# Patient Record
Sex: MALE | Race: WHITE | NOT HISPANIC OR LATINO | Employment: FULL TIME | URBAN - METROPOLITAN AREA
[De-identification: names, ages, dates, MRNs, and addresses within clinical notes are randomized per-mention and may not be internally consistent; named-entity substitution may affect disease eponyms.]

---

## 2018-08-16 ENCOUNTER — APPOINTMENT (OUTPATIENT)
Dept: CT IMAGING | Facility: HOSPITAL | Age: 23
End: 2018-08-16

## 2018-08-16 ENCOUNTER — HOSPITAL ENCOUNTER (EMERGENCY)
Facility: HOSPITAL | Age: 23
Discharge: HOME | End: 2018-08-16
Attending: EMERGENCY MEDICINE | Admitting: EMERGENCY MEDICINE

## 2018-08-16 VITALS
OXYGEN SATURATION: 100 % | HEART RATE: 71 BPM | SYSTOLIC BLOOD PRESSURE: 146 MMHG | RESPIRATION RATE: 16 BRPM | HEIGHT: 67 IN | DIASTOLIC BLOOD PRESSURE: 101 MMHG | TEMPERATURE: 99.9 F | WEIGHT: 191 LBS | BODY MASS INDEX: 29.98 KG/M2

## 2018-08-16 DIAGNOSIS — K52.9 COLITIS, ACUTE: Primary | ICD-10-CM

## 2018-08-16 LAB
ALBUMIN SERPL-MCNC: 5.4 G/DL (ref 3.5–5.2)
ALBUMIN/GLOB SERPL: 1.5 G/DL
ALP SERPL-CCNC: 61 U/L (ref 39–117)
ALT SERPL W P-5'-P-CCNC: 55 U/L (ref 1–41)
ANION GAP SERPL CALCULATED.3IONS-SCNC: 13.6 MMOL/L
AST SERPL-CCNC: 19 U/L (ref 1–40)
BASOPHILS # BLD AUTO: 0.01 10*3/MM3 (ref 0–0.2)
BASOPHILS NFR BLD AUTO: 0.1 % (ref 0–1.5)
BILIRUB SERPL-MCNC: 0.3 MG/DL (ref 0.1–1.2)
BUN BLD-MCNC: 13 MG/DL (ref 6–20)
BUN/CREAT SERPL: 14.4 (ref 7–25)
CALCIUM SPEC-SCNC: 9.7 MG/DL (ref 8.6–10.5)
CHLORIDE SERPL-SCNC: 100 MMOL/L (ref 98–107)
CO2 SERPL-SCNC: 26.4 MMOL/L (ref 22–29)
CREAT BLD-MCNC: 0.9 MG/DL (ref 0.76–1.27)
DEPRECATED RDW RBC AUTO: 44.8 FL (ref 37–54)
EOSINOPHIL # BLD AUTO: 0 10*3/MM3 (ref 0–0.7)
EOSINOPHIL NFR BLD AUTO: 0 % (ref 0.3–6.2)
ERYTHROCYTE [DISTWIDTH] IN BLOOD BY AUTOMATED COUNT: 13.2 % (ref 11.5–14.5)
GFR SERPL CREATININE-BSD FRML MDRD: 104 ML/MIN/1.73
GLOBULIN UR ELPH-MCNC: 3.5 GM/DL
GLUCOSE BLD-MCNC: 112 MG/DL (ref 65–99)
HCT VFR BLD AUTO: 51.9 % (ref 40.4–52.2)
HGB BLD-MCNC: 16.6 G/DL (ref 13.7–17.6)
HOLD SPECIMEN: NORMAL
HOLD SPECIMEN: NORMAL
IMM GRANULOCYTES # BLD: 0.02 10*3/MM3 (ref 0–0.03)
IMM GRANULOCYTES NFR BLD: 0.2 % (ref 0–0.5)
LIPASE SERPL-CCNC: 20 U/L (ref 13–60)
LYMPHOCYTES # BLD AUTO: 1.18 10*3/MM3 (ref 0.9–4.8)
LYMPHOCYTES NFR BLD AUTO: 9.4 % (ref 19.6–45.3)
MCH RBC QN AUTO: 29.8 PG (ref 27–32.7)
MCHC RBC AUTO-ENTMCNC: 32 G/DL (ref 32.6–36.4)
MCV RBC AUTO: 93.2 FL (ref 79.8–96.2)
MONOCYTES # BLD AUTO: 0.72 10*3/MM3 (ref 0.2–1.2)
MONOCYTES NFR BLD AUTO: 5.7 % (ref 5–12)
NEUTROPHILS # BLD AUTO: 10.69 10*3/MM3 (ref 1.9–8.1)
NEUTROPHILS NFR BLD AUTO: 84.6 % (ref 42.7–76)
PLATELET # BLD AUTO: 290 10*3/MM3 (ref 140–500)
PMV BLD AUTO: 12 FL (ref 6–12)
POTASSIUM BLD-SCNC: 4.4 MMOL/L (ref 3.5–5.2)
PROT SERPL-MCNC: 8.9 G/DL (ref 6–8.5)
RBC # BLD AUTO: 5.57 10*6/MM3 (ref 4.6–6)
SODIUM BLD-SCNC: 140 MMOL/L (ref 136–145)
WBC NRBC COR # BLD: 12.62 10*3/MM3 (ref 4.5–10.7)
WHOLE BLOOD HOLD SPECIMEN: NORMAL
WHOLE BLOOD HOLD SPECIMEN: NORMAL

## 2018-08-16 PROCEDURE — 96374 THER/PROPH/DIAG INJ IV PUSH: CPT

## 2018-08-16 PROCEDURE — 85025 COMPLETE CBC W/AUTO DIFF WBC: CPT | Performed by: NURSE PRACTITIONER

## 2018-08-16 PROCEDURE — 74177 CT ABD & PELVIS W/CONTRAST: CPT

## 2018-08-16 PROCEDURE — 25010000002 IOPAMIDOL 61 % SOLUTION: Performed by: EMERGENCY MEDICINE

## 2018-08-16 PROCEDURE — 80053 COMPREHEN METABOLIC PANEL: CPT | Performed by: NURSE PRACTITIONER

## 2018-08-16 PROCEDURE — 83690 ASSAY OF LIPASE: CPT | Performed by: NURSE PRACTITIONER

## 2018-08-16 PROCEDURE — 25010000002 KETOROLAC TROMETHAMINE PER 15 MG: Performed by: NURSE PRACTITIONER

## 2018-08-16 PROCEDURE — 25010000002 ONDANSETRON PER 1 MG: Performed by: NURSE PRACTITIONER

## 2018-08-16 PROCEDURE — 99283 EMERGENCY DEPT VISIT LOW MDM: CPT

## 2018-08-16 PROCEDURE — 96375 TX/PRO/DX INJ NEW DRUG ADDON: CPT

## 2018-08-16 RX ORDER — KETOROLAC TROMETHAMINE 30 MG/ML
30 INJECTION, SOLUTION INTRAMUSCULAR; INTRAVENOUS ONCE
Status: COMPLETED | OUTPATIENT
Start: 2018-08-16 | End: 2018-08-16

## 2018-08-16 RX ORDER — CIPROFLOXACIN 500 MG/1
500 TABLET, FILM COATED ORAL EVERY 12 HOURS
Qty: 10 TABLET | Refills: 0 | Status: SHIPPED | OUTPATIENT
Start: 2018-08-16 | End: 2020-08-24

## 2018-08-16 RX ORDER — SODIUM CHLORIDE 0.9 % (FLUSH) 0.9 %
10 SYRINGE (ML) INJECTION AS NEEDED
Status: DISCONTINUED | OUTPATIENT
Start: 2018-08-16 | End: 2018-08-17 | Stop reason: HOSPADM

## 2018-08-16 RX ORDER — METRONIDAZOLE 500 MG/1
500 TABLET ORAL 3 TIMES DAILY
Qty: 15 TABLET | Refills: 0 | Status: SHIPPED | OUTPATIENT
Start: 2018-08-16 | End: 2020-08-24

## 2018-08-16 RX ORDER — ONDANSETRON 2 MG/ML
4 INJECTION INTRAMUSCULAR; INTRAVENOUS ONCE
Status: COMPLETED | OUTPATIENT
Start: 2018-08-16 | End: 2018-08-16

## 2018-08-16 RX ADMIN — IOPAMIDOL 85 ML: 612 INJECTION, SOLUTION INTRAVENOUS at 21:07

## 2018-08-16 RX ADMIN — ONDANSETRON 4 MG: 2 INJECTION INTRAMUSCULAR; INTRAVENOUS at 20:25

## 2018-08-16 RX ADMIN — SODIUM CHLORIDE 1000 ML: 9 INJECTION, SOLUTION INTRAVENOUS at 20:26

## 2018-08-16 RX ADMIN — KETOROLAC TROMETHAMINE 30 MG: 30 INJECTION, SOLUTION INTRAMUSCULAR at 20:25

## 2018-08-16 NOTE — ED TRIAGE NOTES
Patient to er with nausea/ vomiting and ABD pain that started today.patient reported blood in stool today.

## 2018-08-17 NOTE — ED PROVIDER NOTES
[facility] EMERGENCY DEPARTMENT ENCOUNTER    CHIEF COMPLAINT  Chief Complaint: abdominal pain  History given by: patient  History limited by: none  [facility] Room Number: 39/39  PMD: Provider, No Known      HPI:  Pt is a 23 y.o. male who presents complaining of diffuse waxing and waning upper abdominal pain that radiates around to the back. He denies vomiting, dysuria, SOA and other complaints, but reports chills and diaphoresis. Denies abdominal surgeries and pancreatitis. Pt reports having a US of his gall bladder a few years go and found it to be enlarged.    Onset: gradual  Duration: one day  Severity: moderate  Associated symptoms: vomiting, dysuria  Previous treatment: none    PAST MEDICAL HISTORY  Active Ambulatory Problems     Diagnosis Date Noted   • No Active Ambulatory Problems     Resolved Ambulatory Problems     Diagnosis Date Noted   • No Resolved Ambulatory Problems     No Additional Past Medical History       PAST SURGICAL HISTORY  History reviewed. No pertinent surgical history.    FAMILY HISTORY  History reviewed. No pertinent family history.    SOCIAL HISTORY  Social History     Social History   • Marital status: Single     Spouse name: N/A   • Number of children: N/A   • Years of education: N/A     Occupational History   • Not on file.     Social History Main Topics   • Smoking status: Former Smoker     Quit date: 8/16/2015   • Smokeless tobacco: Never Used   • Alcohol use Yes      Comment: occ   • Drug use: No   • Sexual activity: Defer     Other Topics Concern   • Not on file     Social History Narrative   • No narrative on file       ALLERGIES  Patient has no known allergies.    REVIEW OF SYSTEMS  Review of Systems   Constitutional: Positive for chills and diaphoresis. Negative for activity change, appetite change and fever.   HENT: Negative for congestion and sore throat.    Eyes: Negative.    Respiratory: Negative for cough and shortness of breath.    Cardiovascular: Negative for chest pain and leg  swelling.   Gastrointestinal: Positive for abdominal pain. Negative for diarrhea and vomiting.   Endocrine: Negative.    Genitourinary: Negative for decreased urine volume and dysuria.   Musculoskeletal: Negative for neck pain.   Skin: Negative for rash and wound.   Allergic/Immunologic: Negative.    Neurological: Negative for weakness, numbness and headaches.   Hematological: Negative.    Psychiatric/Behavioral: Negative.    All other systems reviewed and are negative.      PHYSICAL EXAM  ED Triage Vitals   Temp Heart Rate Resp BP SpO2   08/16/18 1643 08/16/18 1643 08/16/18 1643 08/16/18 1732 08/16/18 1643   99.9 °F (37.7 °C) 109 16 124/86 100 %      Temp src Heart Rate Source Patient Position BP Location FiO2 (%)   08/16/18 1643 08/16/18 1643 -- -- --   Tympanic Monitor          Physical Exam   Constitutional: He is oriented to person, place, and time. No distress.   HENT:   Head: Normocephalic and atraumatic.   Eyes: Pupils are equal, round, and reactive to light. EOM are normal.   Neck: Normal range of motion. Neck supple.   Cardiovascular: Normal rate, regular rhythm and normal heart sounds.    Pulmonary/Chest: Effort normal and breath sounds normal. No respiratory distress.   Abdominal: Soft. There is tenderness in the periumbilical area. There is no rebound, no guarding and no CVA tenderness.   Musculoskeletal: Normal range of motion. He exhibits no edema.   Neurological: He is alert and oriented to person, place, and time. He has normal sensation and normal strength.   Skin: Skin is warm and dry.   Psychiatric: Mood and affect normal.   Nursing note and vitals reviewed.      LAB RESULTS  Lab Results (last 24 hours)     Procedure Component Value Units Date/Time    POC Urinalysis Dipstick, Multipro (Automated dipstick) [[id number]]  (Abnormal) Collected:  08/16/18 1607    Specimen:  Urine Updated:  08/16/18 1613     Color Yellow     Clarity, UA Slightly Cloudy (A)     Glucose, UA Negative mg/dL      Bilirubin  Negative     Ketones, UA Negative     Specific Gravity  1.020     Blood, UA Negative     pH, Urine 5.0     Protein, POC Negative mg/dL      Urobilinogen, UA Normal     Nitrite, UA Negative     Leukocytes Trace (A)    CBC & Differential [[id number]] Collected:  08/16/18 1805    Specimen:  Blood Updated:  08/16/18 1831    Narrative:       The following orders were created for panel order CBC & Differential.  Procedure                               Abnormality         Status                     ---------                               -----------         ------                     CBC Auto Differential[[id number]]        Abnormal            Final result                 Please view results for these tests on the individual orders.    Comprehensive Metabolic Panel [[id number]]  (Abnormal) Collected:  08/16/18 1805    Specimen:  Blood Updated:  08/16/18 1849     Glucose 112 (H) mg/dL      BUN 13 mg/dL      Creatinine 0.90 mg/dL      Sodium 140 mmol/L      Potassium 4.4 mmol/L      Chloride 100 mmol/L      CO2 26.4 mmol/L      Calcium 9.7 mg/dL      Total Protein 8.9 (H) g/dL      Albumin 5.40 (H) g/dL      ALT (SGPT) 55 (H) U/L      AST (SGOT) 19 U/L      Alkaline Phosphatase 61 U/L      Total Bilirubin 0.3 mg/dL      eGFR Non African Amer 104 mL/min/1.73      Globulin 3.5 gm/dL      A/G Ratio 1.5 g/dL      BUN/Creatinine Ratio 14.4     Anion Gap 13.6 mmol/L     Lipase [[id number]]  (Normal) Collected:  08/16/18 1805    Specimen:  Blood Updated:  08/16/18 1849     Lipase 20 U/L     CBC Auto Differential [[id number]]  (Abnormal) Collected:  08/16/18 1805    Specimen:  Blood Updated:  08/16/18 1831     WBC 12.62 (H) 10*3/mm3      RBC 5.57 10*6/mm3      Hemoglobin 16.6 g/dL      Hematocrit 51.9 %      MCV 93.2 fL      MCH 29.8 pg      MCHC 32.0 (L) g/dL      RDW 13.2 %      RDW-SD 44.8 fl      MPV 12.0 fL      Platelets 290 10*3/mm3      Neutrophil % 84.6 (H) %      Lymphocyte % 9.4 (L) %      Monocyte % 5.7 %      Eosinophil %  0.0 (L) %      Basophil % 0.1 %      Immature Grans % 0.2 %      Neutrophils, Absolute 10.69 (H) 10*3/mm3      Lymphocytes, Absolute 1.18 10*3/mm3      Monocytes, Absolute 0.72 10*3/mm3      Eosinophils, Absolute 0.00 10*3/mm3      Basophils, Absolute 0.01 10*3/mm3      Immature Grans, Absolute 0.02 10*3/mm3           I ordered the above labs and reviewed the results    RADIOLOGY  CT Abdomen Pelvis With Contrast   Preliminary Result   Mild colitis of the distal transverse and descending colon is suspected.   No obstruction, perforation or abscess.                          I ordered the above noted radiological studies. Interpreted by radiologist. Reviewed by me in PACS.       PROCEDURES  Procedures      PROGRESS AND CONSULTS  ED Course as of Aug 16 2213   Thu Aug 16, 2018   1826 Abdominal pain with nausea & vomiting that began today.  Pt denies fever, chills or urinary symptoms  [MS]      ED Course User Index  [MS] Cristinamarli Jocelynn R, APRN   2200  BP- (!) 146/101 HR- 71 Temp- 99.9 °F (37.7 °C) (Tympanic) O2 sat- 100%  Rechecked the patient who is in NAD and is resting comfortably. Pt made aware that imaging showed colitis and the plan to d/c w/ abx. Pt will be given work note.    MEDICAL DECISION MAKING  Results were reviewed/discussed with the patient and they were also made aware of online access. Pt also made aware that some labs, such as cultures, will not be resulted during ER visit and follow up with PMD is necessary.     MDM  Number of Diagnoses or Management Options  Colitis, acute:      Amount and/or Complexity of Data Reviewed  Clinical lab tests: reviewed (Labs unremarkable)  Tests in the radiology section of CPT®: reviewed (CT abd-mild colitis.)  Independent visualization of images, tracings, or specimens: yes    Patient Progress  Patient progress: stable         DIAGNOSIS  Final diagnoses:   Colitis, acute       DISPOSITION  DISCHARGE    Patient discharged in stable condition.    Reviewed  implications of results, diagnosis, meds, responsibility to follow up, warning signs and symptoms of possible worsening, potential complications and reasons to return to ER.    Patient/Family voiced understanding of above instructions.    Discussed plan for discharge, as there is no emergent indication for admission. Patient referred to primary care provider for BP management due to today's BP. Pt/family is agreeable and understands need for follow up and repeat testing.  Pt is aware that discharge does not mean that nothing is wrong but it indicates no emergency is present that requires admission and they must continue care with follow-up as given below or physician of their choice.     FOLLOW-UP  PATIENT LIAISON [facility] [id number]  [phone]  Schedule an appointment as soon as possible for a visit            Medication List      New Prescriptions    ciprofloxacin 500 MG tablet  Commonly known as:  CIPRO  Take 1 tablet by mouth Every 12 (Twelve) Hours.     metroNIDAZOLE 500 MG tablet  Commonly known as:  FLAGYL  Take 1 tablet by mouth 3 (Three) Times a Day.            Latest Documented Vital Signs:  As of 10:13 PM  BP- (!) 146/101 HR- 71 Temp- 99.9 °F (37.7 °C) (Tympanic) O2 sat- 100%    --  Documentation assistance provided by thomas Nguyen for Dr. Alcantar.  Information recorded by the scribe was done at my direction and has been verified and validated by me.       Reina Nguyen  08/16/18 [id number]       Rahul Alcantar MD  08/17/18 [id number]

## 2020-08-24 ENCOUNTER — HOSPITAL ENCOUNTER (OUTPATIENT)
Dept: GENERAL RADIOLOGY | Facility: HOSPITAL | Age: 25
Discharge: HOME | End: 2020-08-24
Admitting: FAMILY MEDICINE

## 2020-08-24 PROCEDURE — U0003 INFECTIOUS AGENT DETECTION BY NUCLEIC ACID (DNA OR RNA); SEVERE ACUTE RESPIRATORY SYNDROME CORONAVIRUS 2 (SARS-COV-2) (CORONAVIRUS DISEASE [COVID-19]), AMPLIFIED PROBE TECHNIQUE, MAKING USE OF HIGH THROUGHPUT TECHNOLOGIES AS DESCRIBED BY CMS-2020-01-R: HCPCS | Performed by: FAMILY MEDICINE

## 2020-08-24 PROCEDURE — 71046 X-RAY EXAM CHEST 2 VIEWS: CPT

## 2020-09-25 ENCOUNTER — HOSPITAL ENCOUNTER (OUTPATIENT)
Dept: GENERAL RADIOLOGY | Facility: HOSPITAL | Age: 25
Discharge: HOME | End: 2020-09-25

## 2020-09-25 PROCEDURE — 71046 X-RAY EXAM CHEST 2 VIEWS: CPT

## 2020-09-25 PROCEDURE — 74018 RADEX ABDOMEN 1 VIEW: CPT

## 2021-01-05 ENCOUNTER — OFFICE VISIT (OUTPATIENT)
Age: 26
End: 2021-01-05

## 2021-01-05 ENCOUNTER — OFFICE VISIT (OUTPATIENT)
Dept: URGENT CARE | Age: 26
End: 2021-01-05
Payer: COMMERCIAL

## 2021-01-05 VITALS
SYSTOLIC BLOOD PRESSURE: 115 MMHG | DIASTOLIC BLOOD PRESSURE: 81 MMHG | RESPIRATION RATE: 18 BRPM | TEMPERATURE: 98.5 F | OXYGEN SATURATION: 98 % | BODY MASS INDEX: 32.39 KG/M2 | WEIGHT: 206.4 LBS | HEART RATE: 93 BPM | HEIGHT: 67 IN

## 2021-01-05 DIAGNOSIS — R05.8 COUGH PRODUCTIVE OF PURULENT SPUTUM: ICD-10-CM

## 2021-01-05 DIAGNOSIS — R06.02 SOB (SHORTNESS OF BREATH): ICD-10-CM

## 2021-01-05 DIAGNOSIS — J02.9 PHARYNGITIS, UNSPECIFIED ETIOLOGY: ICD-10-CM

## 2021-01-05 DIAGNOSIS — Z11.59 SCREENING FOR VIRAL DISEASE: Primary | ICD-10-CM

## 2021-01-05 DIAGNOSIS — L08.9 SKIN INFECTION: ICD-10-CM

## 2021-01-05 DIAGNOSIS — J06.9 UPPER RESPIRATORY TRACT INFECTION, UNSPECIFIED TYPE: Primary | ICD-10-CM

## 2021-01-05 DIAGNOSIS — Z11.59 SCREENING FOR VIRAL DISEASE: ICD-10-CM

## 2021-01-05 LAB — S PYO AG THROAT QL: NORMAL

## 2021-01-05 PROCEDURE — 87880 STREP A ASSAY W/OPTIC: CPT | Performed by: NURSE PRACTITIONER

## 2021-01-05 PROCEDURE — 99204 OFFICE O/P NEW MOD 45 MIN: CPT | Performed by: NURSE PRACTITIONER

## 2021-01-05 RX ORDER — ALBUTEROL SULFATE 90 UG/1
2 AEROSOL, METERED RESPIRATORY (INHALATION) EVERY 6 HOURS PRN
COMMUNITY

## 2021-01-05 RX ORDER — ALBUTEROL SULFATE 90 UG/1
AEROSOL, METERED RESPIRATORY (INHALATION)
Qty: 25.5 G | Refills: 0 | Status: SHIPPED | OUTPATIENT
Start: 2021-01-05 | End: 2021-06-04

## 2021-01-05 RX ORDER — AZITHROMYCIN 250 MG/1
250 TABLET, FILM COATED ORAL SEE ADMIN INSTRUCTIONS
Qty: 6 TABLET | Refills: 0 | Status: SHIPPED | OUTPATIENT
Start: 2021-01-05 | End: 2021-01-10

## 2021-01-05 RX ORDER — METHYLPREDNISOLONE 4 MG/1
TABLET ORAL
Qty: 1 KIT | Refills: 0 | Status: SHIPPED | OUTPATIENT
Start: 2021-01-05 | End: 2021-06-04

## 2021-01-05 RX ORDER — ALBUTEROL SULFATE 90 UG/1
2 AEROSOL, METERED RESPIRATORY (INHALATION) 4 TIMES DAILY PRN
Qty: 1 INHALER | Refills: 0 | Status: SHIPPED | OUTPATIENT
Start: 2021-01-05 | End: 2021-01-05

## 2021-01-05 ASSESSMENT — ENCOUNTER SYMPTOMS
DIARRHEA: 1
CONSTIPATION: 0
CHEST TIGHTNESS: 0
SHORTNESS OF BREATH: 1
EYES NEGATIVE: 1
RHINORRHEA: 1
NAUSEA: 0
SORE THROAT: 1
VOMITING: 0
WHEEZING: 0
COUGH: 1

## 2021-01-05 NOTE — PROGRESS NOTES
Appearance: Normal appearance. He is not ill-appearing or toxic-appearing. HENT:      Head: Normocephalic and atraumatic. Right Ear: Tympanic membrane, ear canal and external ear normal.      Left Ear: Tympanic membrane, ear canal and external ear normal.      Nose: Nose normal.      Mouth/Throat:      Mouth: Mucous membranes are moist.      Pharynx: Posterior oropharyngeal erythema present. Eyes:      Extraocular Movements: Extraocular movements intact. Conjunctiva/sclera: Conjunctivae normal.      Pupils: Pupils are equal, round, and reactive to light. Neck:     Cardiovascular:      Rate and Rhythm: Normal rate and regular rhythm. Heart sounds: Normal heart sounds. No murmur. Pulmonary:      Effort: Pulmonary effort is normal. No respiratory distress. Breath sounds: No wheezing. Musculoskeletal:         General: No swelling or signs of injury. Skin:     General: Skin is warm and dry. Findings: No rash. Neurological:      General: No focal deficit present. Mental Status: He is alert and oriented to person, place, and time. Motor: No weakness. Psychiatric:         Mood and Affect: Mood normal.       /81   Pulse 93   Temp 98.5 °F (36.9 °C)   Resp 18   Ht 5' 7\" (1.702 m)   Wt 206 lb 6.4 oz (93.6 kg)   SpO2 98%   BMI 32.33 kg/m²     Assessment:       Diagnosis Orders   1. Upper respiratory tract infection, unspecified type     2. SOB (shortness of breath)  methylPREDNISolone (MEDROL DOSEPACK) 4 MG tablet    albuterol sulfate HFA (VENTOLIN HFA) 108 (90 Base) MCG/ACT inhaler   3. Cough productive of purulent sputum  azithromycin (ZITHROMAX) 250 MG tablet   4. Skin infection  mupirocin (BACTROBAN) 2 % ointment   5. Screening for viral disease     6.  Pharyngitis, unspecified etiology  POCT rapid strep A       Plan:      Orders Placed This Encounter   Procedures    POCT rapid strep A     Results for orders placed or performed in visit on 01/05/21 POCT rapid strep A   Result Value Ref Range    Strep A Ag None Detected None Detected       Patient states he was recently diagnosed with mold allergy and was given Albuterol inhaler. He lives at apartment complex that has mold under floors. He states he is almost out of inhaler. Covid test today    Return if symptoms worsen or fail to improve. Orders Placed This Encounter   Medications    mupirocin (BACTROBAN) 2 % ointment     Sig: Apply 3 times daily to right neck. Dispense:  3 g     Refill:  0    azithromycin (ZITHROMAX) 250 MG tablet     Sig: Take 1 tablet by mouth See Admin Instructions for 5 days 500mg on day 1 followed by 250mg on days 2 - 5     Dispense:  6 tablet     Refill:  0    methylPREDNISolone (MEDROL DOSEPACK) 4 MG tablet     Sig: Take by mouth. Dispense:  1 kit     Refill:  0    albuterol sulfate HFA (VENTOLIN HFA) 108 (90 Base) MCG/ACT inhaler     Sig: Inhale 2 puffs into the lungs 4 times daily as needed for Wheezing     Dispense:  1 Inhaler     Refill:  0       Patient given educational materials- see patient instructions. Discussed use, benefit, and side effects of prescribedmedications. All patient questions answered. Pt voiced understanding. Reviewedhealth maintenance. Instructed to continue current medications, diet and exercise. Patient agreed with treatment plan. Follow up as directed. Patient Instructions     Use Mupirocin ointment to wound on neck. Start antibiotic and steroids as prescribed. Strep is negative. You have been tested for coronavirus using a nasopharyngeal swab. You are to quarantine until your test results are back. This typically takes 2-3 days and we will call you with results. 1. Rest and increase water intake. 2. Monitor for fever and treat as needed with over the counter Tylenol/Ibuprofen per package instructions. 3. Treat symptoms such as cough/congestion with over the counter medications. 4. Go to ED if symptoms worsen or if you experience chest pain, shortness of breath, or a fever that is uncontrolled with medication. Patient Education        Learning About Coronavirus [phone])  Coronavirus [phone]): Overview  What is coronavirus ([id number])? The coronavirus disease (COVID-19) is caused by a virus. It is an illness that was first found in December 2019. It has since spread worldwide. The virus can cause fever, cough, and trouble breathing. In severe cases, it can cause pneumonia and make it hard to breathe without help. It can cause death. This virus spreads person-to-person through droplets from coughing and sneezing. It can also spread when you are close to someone who is infected. And it can spread when you touch something that has the virus on it, such as a doorknob or a tabletop. Coronaviruses are a large group of viruses. They cause the common cold. They also cause more serious illnesses like Middle East respiratory syndrome (MERS) and severe acute respiratory syndrome (SARS). COVID-19 is caused by a novel coronavirus. That means it's a new type that has not been seen in people before. How is COVID-19 treated? Mild illness can be treated at home, but more serious illness needs to be treated in the hospital. Treatment may include medicines to reduce symptoms, plus breathing support such as oxygen therapy or a ventilator. Other treatments, such as antiviral medicines, may help people who have COVID-19. What can you do to protect yourself from COVID-19? The best way to protect yourself from getting sick is to:  · Avoid areas where there is an outbreak. · Avoid contact with people who may be infected. · Avoid crowds and try to stay at least 6 feet away from other people. · Wash your hands often, especially after you cough or sneeze. Use soap and water, and scrub for at least 20 seconds. If soap and water aren't available, use an alcohol-based hand . · You have severe trouble breathing. (You can't talk at all.)  · You have constant chest pain or pressure. · You are severely dizzy or lightheaded. · You are confused or can't think clearly. · Your face and lips have a blue color. · You passed out (lost consciousness) or are very hard to wake up. Call your doctor now if you develop symptoms such as:  · Shortness of breath. · Fever. · Cough. If you need to get care, call ahead to the doctor's office for instructions before you go. Make sure you wear a face cover to prevent exposing other people to the virus. Where can you get the latest information? The following health organizations are tracking and studying this virus. Their websites contain the most up-to-date information. Markos Huerta also learn what to do if you think you may have been exposed to the virus. · U.S. Centers for Disease Control and Prevention (CDC): The CDC provides updated news about the disease and travel advice. The website also tells you how to prevent the spread of infection. www.cdc.gov  · World Health Organization [facility]): WHO offers information about the virus outbreaks. WHO also has travel advice. www.who.int  Current as of: July 10, 2020               Content Version: 12.6  © 2006-2020 [employer]. Care instructions adapted under license by [facility] [place] ([facility]). If you have questions about a medical condition or this instruction, always ask your healthcare professional. [place] [address] any warranty or liability for your use of this information. Patient Education        Cough: Care Instructions  Your Care Instructions     A cough is your body's response to something that bothers your throat or airways. Many things can cause a cough. You might cough because of a cold or the flu, bronchitis, or asthma. Smoking, postnasal drip, allergies, and stomach acid that backs up into your throat also can cause coughs. A cough is a symptom, not a disease. Most coughs stop when the cause, such as a cold, goes away. You can take a few steps at home to cough less and feel better. Follow-up care is a key part of your treatment and safety. Be sure to make and go to all appointments, and call your doctor if you are having problems. It's also a good idea to know your test results and keep a list of the medicines you take. How can you care for yourself at home? · Drink lots of water and other fluids. This helps thin the mucus and soothes a dry or sore throat. Honey or lemon juice in hot water or tea may ease a dry cough. · Take cough medicine as directed by your doctor. · Prop up your head on pillows to help you breathe and ease a dry cough. · Try cough drops to soothe a dry or sore throat. Cough drops don't stop a cough. Medicine-flavored cough drops are no better than candy-flavored drops or hard candy. · Do not smoke. Avoid secondhand smoke. If you need help quitting, talk to your doctor about stop-smoking programs and medicines. These can increase your chances of quitting for good. When should you call for help? Call 911 anytime you think you may need emergency care. For example, call if:    · You have severe trouble breathing. Call your doctor now or seek immediate medical care if:    · You cough up blood.     · You have new or worse trouble breathing.     · You have a new or higher fever.     · You have a new rash. Watch closely for changes in your health, and be sure to contact your doctor if:    · You cough more deeply or more often, especially if you notice more mucus or a change in the color of your mucus.     · You have new symptoms, such as a sore throat, an earache, or sinus pain.     · You do not get better as expected. Where can you learn more? Go to https://chpepiceweb.[employer]. org and sign in to your [employer] account. Enter D279 in the [place] box to learn more about \"Cough: Care Instructions. \"     If you do not have an account, please click on the \"Sign Up Now\" link. Current as of: February 24, 2020               Content Version: 12.6  © 8979-6634 [employer]. Care instructions adapted under license by [facility] ([facility]). If you have questions about a medical condition or this instruction, always ask your healthcare professional. [place] [address] any warranty or liability for your use of this information.                Electronically signed by TAHIR Lane CNP on 1/5/2021 at 4:00 PM

## 2021-01-05 NOTE — PATIENT INSTRUCTIONS
Use Mupirocin ointment to wound on neck. Start antibiotic and steroids as prescribed. Strep is negative. You have been tested for coronavirus using a nasopharyngeal swab. You are to quarantine until your test results are back. This typically takes 2-3 days and we will call you with results. 1. Rest and increase water intake. 2. Monitor for fever and treat as needed with over the counter Tylenol/Ibuprofen per package instructions. 3. Treat symptoms such as cough/congestion with over the counter medications. 4. Go to ED if symptoms worsen or if you experience chest pain, shortness of breath, or a fever that is uncontrolled with medication. Patient Education        Learning About Coronavirus [phone])  Coronavirus [phone]): Overview  What is coronavirus ([id number]-87)? The coronavirus disease (COVID-19) is caused by a virus. It is an illness that was first found in December 2019. It has since spread worldwide. The virus can cause fever, cough, and trouble breathing. In severe cases, it can cause pneumonia and make it hard to breathe without help. It can cause death. This virus spreads person-to-person through droplets from coughing and sneezing. It can also spread when you are close to someone who is infected. And it can spread when you touch something that has the virus on it, such as a doorknob or a tabletop. Coronaviruses are a large group of viruses. They cause the common cold. They also cause more serious illnesses like Middle East respiratory syndrome (MERS) and severe acute respiratory syndrome (SARS). COVID-19 is caused by a novel coronavirus. That means it's a new type that has not been seen in people before. How is COVID-19 treated? Mild illness can be treated at home, but more serious illness needs to be treated in the hospital. Treatment may include medicines to reduce symptoms, plus breathing support such as oxygen therapy or a ventilator. Other treatments, such as antiviral medicines, may help people who have COVID-19. What can you do to protect yourself from COVID-19? The best way to protect yourself from getting sick is to:  · Avoid areas where there is an outbreak. · Avoid contact with people who may be infected. · Avoid crowds and try to stay at least 6 feet away from other people. · Wash your hands often, especially after you cough or sneeze. Use soap and water, and scrub for at least 20 seconds. If soap and water aren't available, use an alcohol-based hand . · Avoid touching your mouth, nose, and eyes. What can you do to avoid spreading the virus to others? To help avoid spreading the virus to others:  · Wash your hands often with soap or alcohol-based hand sanitizers. · Cover your mouth with a tissue when you cough or sneeze. Then throw the tissue in the trash. · Use a disinfectant to clean things that you touch often. These include doorknobs, remote controls, phones, and handles on your refrigerator and microwave. And don't forget countertops, tabletops, bathrooms, and computer keyboards. · Wear a cloth face cover if you have to go to public areas. If you know or suspect that you have COVID-19:  · Stay home. Don't go to school, work, or public areas. And don't use public transportation, ride-shares, or taxis unless you have no choice. · Leave your home only if you need to get medical care or testing. But call the doctor's office first so they know you're coming. And wear a face cover. · Limit contact with people in your home. If possible, stay in a separate bedroom and use a separate bathroom. · Wear a face cover whenever you're around other people. It can help stop the spread of the virus when you cough or sneeze. · Clean and disinfect your home every day. Use household  and disinfectant wipes or sprays. Take special care to clean things that you grab with your hands. · Self-isolate until it's safe to be around others again. ? If you have symptoms, it's safe when you haven't had a fever for 3 days and your symptoms have improved and it's been at least 10 days since your symptoms started. ? If you were exposed to the virus but don't have symptoms, it's safe to be around others 14 days after exposure. ? Talk to your doctor about whether you also need testing, especially if you have a weakened immune system. When to call for help  Call 911 anytime you think you may need emergency care. For example, call if:  · You have severe trouble breathing. (You can't talk at all.)  · You have constant chest pain or pressure. · You are severely dizzy or lightheaded. · You are confused or can't think clearly. · Your face and lips have a blue color. · You passed out (lost consciousness) or are very hard to wake up. Call your doctor now if you develop symptoms such as:  · Shortness of breath. · Fever. · Cough. If you need to get care, call ahead to the doctor's office for instructions before you go. Make sure you wear a face cover to prevent exposing other people to the virus. Where can you get the latest information? The following health organizations are tracking and studying this virus. Their websites contain the most up-to-date information. Ginny Harris also learn what to do if you think you may have been exposed to the virus. · U.S. Centers for Disease Control and Prevention (CDC): The CDC provides updated news about the disease and travel advice. The website also tells you how to prevent the spread of infection.  www.cdc.gov · World Health Organization [facility]): WHO offers information about the virus outbreaks. WHO also has travel advice. www.who.int  Current as of: July 10, 2020               Content Version: 12.6  © 2006-2020 [employer]. Care instructions adapted under license by [facility] ([facility]). If you have questions about a medical condition or this instruction, always ask your healthcare professional. [place] [address] any warranty or liability for your use of this information. Patient Education        Cough: Care Instructions  Your Care Instructions     A cough is your body's response to something that bothers your throat or airways. Many things can cause a cough. You might cough because of a cold or the flu, bronchitis, or asthma. Smoking, postnasal drip, allergies, and stomach acid that backs up into your throat also can cause coughs. A cough is a symptom, not a disease. Most coughs stop when the cause, such as a cold, goes away. You can take a few steps at home to cough less and feel better. Follow-up care is a key part of your treatment and safety. Be sure to make and go to all appointments, and call your doctor if you are having problems. It's also a good idea to know your test results and keep a list of the medicines you take. How can you care for yourself at home? · Drink lots of water and other fluids. This helps thin the mucus and soothes a dry or sore throat. Honey or lemon juice in hot water or tea may ease a dry cough. · Take cough medicine as directed by your doctor. · Prop up your head on pillows to help you breathe and ease a dry cough. · Try cough drops to soothe a dry or sore throat. Cough drops don't stop a cough. Medicine-flavored cough drops are no better than candy-flavored drops or hard candy. · Do not smoke. Avoid secondhand smoke. If you need help quitting, talk to your doctor about stop-smoking programs and medicines. These can increase your chances of quitting for good. When should you call for help? Call 911 anytime you think you may need emergency care. For example, call if:    · You have severe trouble breathing. Call your doctor now or seek immediate medical care if:    · You cough up blood.     · You have new or worse trouble breathing.     · You have a new or higher fever.     · You have a new rash. Watch closely for changes in your health, and be sure to contact your doctor if:    · You cough more deeply or more often, especially if you notice more mucus or a change in the color of your mucus.     · You have new symptoms, such as a sore throat, an earache, or sinus pain.     · You do not get better as expected. Where can you learn more? Go to https://[employer]. org and sign in to your [employer] account. Enter D279 in the [employer] box to learn more about \"Cough: Care Instructions. \"     If you do not have an account, please click on the \"Sign Up Now\" link. Current as of: February 24, 2020               Content Version: 12.6  © 2006-2020 [employer]. Care instructions adapted under license by [facility] ([facility]). If you have questions about a medical condition or this instruction, always ask your healthcare professional. [place] [address] any warranty or liability for your use of this information.

## 2021-01-07 LAB — SARS-COV-2, NAA: NOT DETECTED

## 2021-03-29 ENCOUNTER — IMMUNIZATION (OUTPATIENT)
Age: 26
End: 2021-03-29
Payer: COMMERCIAL

## 2021-03-29 PROCEDURE — 91300 COVID-19, PFIZER VACCINE 30MCG/0.3ML DOSE: CPT | Performed by: FAMILY MEDICINE

## 2021-03-29 PROCEDURE — 0001A COVID-19, PFIZER VACCINE 30MCG/0.3ML DOSE: CPT | Performed by: FAMILY MEDICINE

## 2021-04-19 ENCOUNTER — IMMUNIZATION (OUTPATIENT)
Age: 26
End: 2021-04-19
Payer: COMMERCIAL

## 2021-04-19 PROCEDURE — 91300 COVID-19, PFIZER VACCINE 30MCG/0.3ML DOSE: CPT | Performed by: FAMILY MEDICINE

## 2021-04-19 PROCEDURE — 0002A PR IMM ADMN SARSCOV2 30MCG/0.3ML DIL RECON 2ND DOSE: CPT | Performed by: FAMILY MEDICINE

## 2021-06-04 ENCOUNTER — OFFICE VISIT (OUTPATIENT)
Dept: FAMILY MEDICINE CLINIC | Age: 26
End: 2021-06-04
Payer: COMMERCIAL

## 2021-06-04 VITALS
TEMPERATURE: 97.6 F | DIASTOLIC BLOOD PRESSURE: 98 MMHG | HEART RATE: 73 BPM | RESPIRATION RATE: 18 BRPM | HEIGHT: 67 IN | SYSTOLIC BLOOD PRESSURE: 126 MMHG | WEIGHT: 218 LBS | OXYGEN SATURATION: 98 % | BODY MASS INDEX: 34.21 KG/M2

## 2021-06-04 DIAGNOSIS — R53.83 FATIGUE, UNSPECIFIED TYPE: Primary | ICD-10-CM

## 2021-06-04 DIAGNOSIS — R51.9 FREQUENT HEADACHES: ICD-10-CM

## 2021-06-04 DIAGNOSIS — Z76.89 ENCOUNTER TO ESTABLISH CARE: ICD-10-CM

## 2021-06-04 DIAGNOSIS — K52.9 COLITIS: ICD-10-CM

## 2021-06-04 DIAGNOSIS — Z13.220 ENCOUNTER FOR LIPID SCREENING FOR CARDIOVASCULAR DISEASE: ICD-10-CM

## 2021-06-04 DIAGNOSIS — Z13.1 DIABETES MELLITUS SCREENING: ICD-10-CM

## 2021-06-04 DIAGNOSIS — I10 ESSENTIAL HYPERTENSION: ICD-10-CM

## 2021-06-04 DIAGNOSIS — Z13.6 ENCOUNTER FOR LIPID SCREENING FOR CARDIOVASCULAR DISEASE: ICD-10-CM

## 2021-06-04 PROCEDURE — 99204 OFFICE O/P NEW MOD 45 MIN: CPT | Performed by: NURSE PRACTITIONER

## 2021-06-04 RX ORDER — LOSARTAN POTASSIUM 25 MG/1
25 TABLET ORAL DAILY
Qty: 30 TABLET | Refills: 3 | Status: SHIPPED | OUTPATIENT
Start: 2021-06-04 | End: 2021-07-13

## 2021-06-04 ASSESSMENT — PATIENT HEALTH QUESTIONNAIRE - PHQ9
SUM OF ALL RESPONSES TO PHQ QUESTIONS 1-9: 1
SUM OF ALL RESPONSES TO PHQ QUESTIONS 1-9: 1
2. FEELING DOWN, DEPRESSED OR HOPELESS: 0
1. LITTLE INTEREST OR PLEASURE IN DOING THINGS: 1
SUM OF ALL RESPONSES TO PHQ9 QUESTIONS 1 & 2: 1
SUM OF ALL RESPONSES TO PHQ QUESTIONS 1-9: 1

## 2021-06-04 ASSESSMENT — ENCOUNTER SYMPTOMS
CHEST TIGHTNESS: 0
VOMITING: 0
ABDOMINAL PAIN: 0
COUGH: 0
SHORTNESS OF BREATH: 0
DIARRHEA: 0
NAUSEA: 0
WHEEZING: 0
BLOOD IN STOOL: 0
SORE THROAT: 0

## 2021-06-04 NOTE — PROGRESS NOTES
Deandra Buck is a 31 y.o. male who presents today for  Chief Complaint   Patient presents with    New Patient       HPI:  Here to establish care. Previous PCP in [place]. He has HTN, previously treated with lisinopril but has been out for a few months. He feels he had \"cold sweats\" on the lisinopril, none since off med. He does not have a bp cuff so has not been checking readings. He has had some fatigue. No chest pain or sob. He was diagnosed with colitis 2 years ago in [place]. He has had no significant problems recently. He occasionally has diarrhea with certain foods, alcohol. He has never had a colonoscopy. He has had no recent abdominal pain, no rectal bleeding. He has a h/o depression and anxiety. He took wellbutrin and lexapro in the past but felt \"aggressive\" when he was taking them. Better now, mood is stable. He has had recurrent headaches occurring 1-2 times per week. Pain is mainly frontal.  He has light sensitivity, nausea. Occasionally sees yellow \"dots\" in vision. He has taken excedrin as needed with some improvement. Has h/o migraines. Review of Systems   Constitutional: Negative for chills and fever. HENT: Negative for congestion, ear pain and sore throat. Eyes: Negative for visual disturbance. Respiratory: Negative for cough, chest tightness, shortness of breath and wheezing. Cardiovascular: Negative for chest pain. Gastrointestinal: Negative for abdominal pain, blood in stool, diarrhea, nausea and vomiting. Musculoskeletal: Negative for arthralgias and myalgias. Skin: Negative for rash. Neurological: Positive for headaches. Negative for dizziness, syncope and weakness. Psychiatric/Behavioral: Negative for dysphoric mood. The patient is not nervous/anxious.         Past Medical History:   Diagnosis Date    Allergic rhinitis     Anxiety     Colitis     Depression     Essential hypertension        Current Outpatient Medications   Medication Sig Dispense Refill    5-Hydroxytryptophan (5-HTP PO) Take by mouth      losartan (COZAAR) 25 MG tablet Take 1 tablet by mouth daily 30 tablet 3    diclofenac (VOLTAREN) 50 MG EC tablet Take 1 tablet by mouth 2 times daily as needed for Pain 60 tablet 2    albuterol sulfate HFA (VENTOLIN HFA) 108 (90 Base) MCG/ACT inhaler Inhale 2 puffs into the lungs every 6 hours as needed for Wheezing       No current facility-administered medications for this visit. Allergies   Allergen Reactions    Molds & Smuts Shortness Of Breath       Past Surgical History:   Procedure Laterality Date    EYE SURGERY  1996       Social History     Tobacco Use    Smoking status: Former Smoker     Types: Cigarettes    Smokeless tobacco: Never Used   Vaping Use    Vaping Use: Never used   Substance Use Topics    Alcohol use: Not Currently     Comment: social    Drug use: Never       Family History   Problem Relation Age of Onset    No Known Problems Mother     No Known Problems Father     Breast Cancer Paternal Grandmother        BP (!) 126/98   Pulse 73   Temp 97.6 °F (36.4 °C) (Temporal)   Resp 18   Ht 5' 7\" (1.702 m)   Wt 218 lb (98.9 kg)   SpO2 98%   BMI 34.14 kg/m²     Physical Exam  Vitals reviewed. Constitutional:       General: He is not in acute distress. Appearance: Normal appearance. He is well-developed. HENT:      Head: Normocephalic. Right Ear: Tympanic membrane and external ear normal.      Left Ear: Tympanic membrane and external ear normal.      Nose: Nose normal.      Mouth/Throat:      Mouth: Mucous membranes are moist.      Pharynx: No oropharyngeal exudate or posterior oropharyngeal erythema. Eyes:      Conjunctiva/sclera: Conjunctivae normal.      Pupils: Pupils are equal, round, and reactive to light. Neck:      Thyroid: No thyromegaly. Vascular: No carotid bruit or JVD. Trachea: No tracheal deviation.    Cardiovascular:      Rate and Rhythm: Normal rate and regular rhythm. Heart sounds: Normal heart sounds. No murmur heard. Pulmonary:      Effort: Pulmonary effort is normal. No respiratory distress. Breath sounds: Normal breath sounds. Musculoskeletal:         General: Normal range of motion. Cervical back: Normal range of motion and neck supple. Lymphadenopathy:      Cervical: No cervical adenopathy. Skin:     General: Skin is warm and dry. Findings: No rash. Neurological:      Mental Status: He is alert. Psychiatric:         Mood and Affect: Mood normal.         Behavior: Behavior normal.         Thought Content: Thought content normal.         ASSESSMENT/PLAN:  1. Essential hypertension  -Start losartan 25 mg daily, SE profile reviewed. -He will be getting a bp cuff. Advised to monitor morning and evening, goal <140/90.  - losartan (COZAAR) 25 MG tablet; Take 1 tablet by mouth daily  Dispense: 30 tablet; Refill: 3    2. Fatigue, unspecified type  -Check labs as ordered. Will see if he improves on his bp medication.  - CBC Auto Differential; Future  - Comprehensive Metabolic Panel; Future  - TSH without Reflex; Future  - T4, Free; Future  - Vitamin B12; Future    3. Colitis  -Refer to GI due to h/o colitis, occasional flareups. He requests referral.  - Mariusz Angulo MD, Gastroenterology, [facility]    4. Frequent headaches  -Diclofenac 50 mg bid prn. Advised to avoid otc NSAIDs.  -May be in part due to uncontrolled htn. Will monitor. 5. Diabetes mellitus screening    - Hemoglobin A1C; Future    6. Encounter for lipid screening for cardiovascular disease    - Lipid Panel; Future    7. Encounter to establish care           Return for follow up, 30 minute visit. Therese Corona was seen today for new patient. Diagnoses and all orders for this visit:    Fatigue, unspecified type  -     CBC Auto Differential; Future  -     Comprehensive Metabolic Panel; Future  -     TSH without Reflex;  Future  -     T4, Free; Future  -     Vitamin B12; Future    Essential hypertension  -     losartan (COZAAR) 25 MG tablet; Take 1 tablet by mouth daily    Colitis  -     Dre Currie MD, Gastroenterology, [place]    Frequent headaches    Diabetes mellitus screening  -     Hemoglobin A1C; Future    Encounter for lipid screening for cardiovascular disease  -     Lipid Panel; Future    Encounter to establish care    Other orders  -     diclofenac (VOLTAREN) 50 MG EC tablet; Take 1 tablet by mouth 2 times daily as needed for Pain      Medications Discontinued During This Encounter   Medication Reason    albuterol sulfate  (90 Base) MCG/ACT inhaler LIST CLEANUP    methylPREDNISolone (MEDROL DOSEPACK) 4 MG tablet LIST CLEANUP     There are no Patient Instructions on file for this visit. Patient voicesunderstanding and agrees to plans along with risks and benefits of plan. Counseling:  Fiona Camargon's case, medications and options were discussed in detail. Patient was instructed to call the office if he questionsregarding him treatment. Should him conditions worsen, he should return to office to be reassessed by TAHIR Patel. he Should to go the closest Emergency Department for any emergency. They verbalizedunderstanding the above instructions. Return for follow up, 30 minute visit.

## 2021-07-13 ENCOUNTER — TELEPHONE (OUTPATIENT)
Dept: FAMILY MEDICINE CLINIC | Age: 26
End: 2021-07-13

## 2021-07-13 ENCOUNTER — OFFICE VISIT (OUTPATIENT)
Dept: FAMILY MEDICINE CLINIC | Age: 26
End: 2021-07-13
Payer: COMMERCIAL

## 2021-07-13 VITALS
WEIGHT: 217 LBS | HEIGHT: 67 IN | SYSTOLIC BLOOD PRESSURE: 112 MMHG | BODY MASS INDEX: 34.06 KG/M2 | OXYGEN SATURATION: 99 % | HEART RATE: 100 BPM | TEMPERATURE: 97.8 F | DIASTOLIC BLOOD PRESSURE: 82 MMHG | RESPIRATION RATE: 18 BRPM

## 2021-07-13 DIAGNOSIS — K52.9 COLITIS: ICD-10-CM

## 2021-07-13 DIAGNOSIS — R11.0 NAUSEA: ICD-10-CM

## 2021-07-13 DIAGNOSIS — G47.09 OTHER INSOMNIA: ICD-10-CM

## 2021-07-13 DIAGNOSIS — I10 ESSENTIAL HYPERTENSION: Primary | ICD-10-CM

## 2021-07-13 DIAGNOSIS — M54.9 UPPER BACK PAIN ON RIGHT SIDE: ICD-10-CM

## 2021-07-13 PROCEDURE — 99214 OFFICE O/P EST MOD 30 MIN: CPT | Performed by: NURSE PRACTITIONER

## 2021-07-13 RX ORDER — ONDANSETRON 4 MG/1
4 TABLET, FILM COATED ORAL 3 TIMES DAILY PRN
Qty: 20 TABLET | Refills: 0 | Status: SHIPPED | OUTPATIENT
Start: 2021-07-13 | End: 2022-03-02

## 2021-07-13 RX ORDER — PHENOL 1.4 %
1 AEROSOL, SPRAY (ML) MUCOUS MEMBRANE NIGHTLY PRN
Qty: 30 TABLET | Refills: 0 | COMMUNITY
Start: 2021-07-13

## 2021-07-13 RX ORDER — CYCLOBENZAPRINE HCL 10 MG
10 TABLET ORAL 2 TIMES DAILY
Qty: 30 TABLET | Refills: 0 | Status: SHIPPED | OUTPATIENT
Start: 2021-07-13 | End: 2021-07-28

## 2021-07-13 ASSESSMENT — ENCOUNTER SYMPTOMS
DIARRHEA: 0
BACK PAIN: 1
CHEST TIGHTNESS: 0
SORE THROAT: 0
SHORTNESS OF BREATH: 0
WHEEZING: 0
NAUSEA: 0
COUGH: 0
ABDOMINAL PAIN: 0

## 2021-07-13 NOTE — TELEPHONE ENCOUNTER
He did not hear from GI. Referral placed at last visit. It looks like they tried to contact him. He is not sure why his phone was not working. Please see if an appt can be scheduled. normal...

## 2021-07-13 NOTE — PROGRESS NOTES
Barrington Gomez is a 31 y.o. male who presents today for  Chief Complaint   Patient presents with    Follow-up    Medication Problem     losartan side effects       HPI:  He cannot tolerate losartan, felt clammy with cold sweats as he did with lisinopril. He took for 2 weeks. He states BP was not much better when taking, 130s/90s. BP is normal here today off medication. He was given diclofenac for headaches. This is working well, dose is effective. He has taken twice. He has had pain in right upper back for 2 weeks. No known injury. No neck pain. Feels muscular. No arm weakness or numbness/tingling. He has had insomnia for the past couple weeks. This started around the same time as his upper back pain. He has trouble falling asleep but stays asleep with no problem. Review of Systems   Constitutional: Negative for chills and fever. HENT: Negative for congestion, ear pain and sore throat. Respiratory: Negative for cough, chest tightness, shortness of breath and wheezing. Cardiovascular: Negative for chest pain. Gastrointestinal: Negative for abdominal pain, diarrhea and nausea. Musculoskeletal: Positive for back pain (R upper back). Negative for arthralgias and myalgias. Skin: Negative for rash. Psychiatric/Behavioral: Positive for sleep disturbance.        Past Medical History:   Diagnosis Date    Allergic rhinitis     Anxiety     Colitis     Depression     Essential hypertension        Current Outpatient Medications   Medication Sig Dispense Refill    cyclobenzaprine (FLEXERIL) 10 MG tablet Take 1 tablet by mouth 2 times daily for 15 days 30 tablet 0    ondansetron (ZOFRAN) 4 MG tablet Take 1 tablet by mouth 3 times daily as needed for Nausea or Vomiting 20 tablet 0    Melatonin 10 MG TABS Take 1 tablet by mouth nightly as needed (insomnia) 30 tablet 0    diclofenac (VOLTAREN) 50 MG EC tablet Take 1 tablet by mouth 2 times daily as needed for Pain 60 tablet 2    albuterol sulfate HFA (VENTOLIN HFA) 108 (90 Base) MCG/ACT inhaler Inhale 2 puffs into the lungs every 6 hours as needed for Wheezing       No current facility-administered medications for this visit. Allergies   Allergen Reactions    Molds & Smuts Shortness Of Breath       Past Surgical History:   Procedure Laterality Date    EYE SURGERY  1996       Social History     Tobacco Use    Smoking status: Former Smoker     Types: Cigarettes    Smokeless tobacco: Never Used   Vaping Use    Vaping Use: Never used   Substance Use Topics    Alcohol use: Not Currently     Comment: social    Drug use: Never       Family History   Problem Relation Age of Onset    No Known Problems Mother     No Known Problems Father     Breast Cancer Paternal Grandmother        /82   Pulse 100   Temp 97.8 °F (36.6 °C) (Temporal)   Resp 18   Ht 5' 7\" (1.702 m)   Wt 217 lb (98.4 kg)   SpO2 99%   BMI 33.99 kg/m²     Physical Exam  Vitals reviewed. Constitutional:       General: He is not in acute distress. Appearance: Normal appearance. He is well-developed. HENT:      Head: Normocephalic. Eyes:      Conjunctiva/sclera: Conjunctivae normal.      Pupils: Pupils are equal, round, and reactive to light. Neck:      Thyroid: No thyromegaly. Vascular: No carotid bruit or JVD. Trachea: No tracheal deviation. Cardiovascular:      Rate and Rhythm: Normal rate and regular rhythm. Heart sounds: Normal heart sounds. No murmur heard. Pulmonary:      Effort: Pulmonary effort is normal. No respiratory distress. Breath sounds: Normal breath sounds. Musculoskeletal:         General: Tenderness present. Normal range of motion. Cervical back: Normal range of motion and neck supple. Comments: Mild tenderness R upper back. No shoulder tenderness. Lymphadenopathy:      Cervical: No cervical adenopathy. Skin:     General: Skin is warm and dry. Findings: No rash.    Neurological: Mental Status: He is alert. Psychiatric:         Mood and Affect: Mood normal.         Behavior: Behavior normal.         Thought Content: Thought content normal.         ASSESSMENT/PLAN:  1. Essential hypertension  -BP is in normal range today off medication. Advised that he monitor at home over the next 2 weeks. He will send readings through my chart. Goal <140/90. Hold off on adding new medication. 2. Other insomnia  -Acute, correlates with when his back pain started. Advised that he try to increase melatonin to 10 mg nightly as needed. He tried 5 mg and it was ineffective. 3. Upper back pain on right side  -Advised that he can try diclofenac as needed prescribed previously for headaches.  -Cyclobenzaprine 10 mg twice daily as needed. Discussed potential drowsiness. 4. Nausea  -He requested Rx for nausea medication due to his history of GI issues, colitis. No recent flareups but it would like to have on hand. We'll send Zofran 4 mg three times daily as needed. Return in about 3 months (around 10/13/2021) for follow up. Gil was seen today for follow-up and medication problem. Diagnoses and all orders for this visit:    Essential hypertension    Other insomnia    Upper back pain on right side    Nausea    Other orders  -     cyclobenzaprine (FLEXERIL) 10 MG tablet; Take 1 tablet by mouth 2 times daily for 15 days  -     ondansetron (ZOFRAN) 4 MG tablet; Take 1 tablet by mouth 3 times daily as needed for Nausea or Vomiting  -     Melatonin 10 MG TABS; Take 1 tablet by mouth nightly as needed (insomnia)      Medications Discontinued During This Encounter   Medication Reason    5-Hydroxytryptophan (5-HTP PO) LIST CLEANUP    losartan (COZAAR) 25 MG tablet LIST CLEANUP     There are no Patient Instructions on file for this visit. Patient voicesunderstanding and agrees to plans along with risks and benefits of plan.     Counseling:  Mike delarosa's case, medications and options were discussed in detail. Patient was instructed to call the office if he questionsregarding him treatment. Should him conditions worsen, he should return to office to be reassessed by TAHIR Nye. he Should to go the closest Emergency Department for any emergency. They verbalizedunderstanding the above instructions. Return in about 3 months (around 10/13/2021) for follow up.

## 2021-07-31 PROCEDURE — U0004 COV-19 TEST NON-CDC HGH THRU: HCPCS | Performed by: NURSE PRACTITIONER

## 2021-09-21 ENCOUNTER — OFFICE VISIT (OUTPATIENT)
Dept: FAMILY MEDICINE CLINIC | Age: 26
End: 2021-09-21
Payer: COMMERCIAL

## 2021-09-21 VITALS
OXYGEN SATURATION: 97 % | TEMPERATURE: 97.1 F | HEIGHT: 67 IN | HEART RATE: 90 BPM | SYSTOLIC BLOOD PRESSURE: 124 MMHG | DIASTOLIC BLOOD PRESSURE: 82 MMHG | WEIGHT: 224 LBS | BODY MASS INDEX: 35.16 KG/M2 | RESPIRATION RATE: 18 BRPM

## 2021-09-21 DIAGNOSIS — I10 ESSENTIAL HYPERTENSION: Primary | ICD-10-CM

## 2021-09-21 DIAGNOSIS — K52.9 COLITIS: ICD-10-CM

## 2021-09-21 DIAGNOSIS — F51.01 PRIMARY INSOMNIA: ICD-10-CM

## 2021-09-21 PROCEDURE — 99214 OFFICE O/P EST MOD 30 MIN: CPT | Performed by: NURSE PRACTITIONER

## 2021-09-21 RX ORDER — TRAZODONE HYDROCHLORIDE 50 MG/1
50 TABLET ORAL NIGHTLY PRN
Qty: 30 TABLET | Refills: 3 | Status: SHIPPED | OUTPATIENT
Start: 2021-09-21 | End: 2022-03-02 | Stop reason: ALTCHOICE

## 2021-09-21 ASSESSMENT — ENCOUNTER SYMPTOMS
CHEST TIGHTNESS: 0
COUGH: 0
SHORTNESS OF BREATH: 0
BLOOD IN STOOL: 0
ABDOMINAL PAIN: 1
DIARRHEA: 1
VOMITING: 0
WHEEZING: 0
SORE THROAT: 0
NAUSEA: 0

## 2021-09-21 NOTE — PROGRESS NOTES
Qi Lebron is a 31 y.o. male who presents today for  Chief Complaint   Patient presents with    Abdominal Pain     possible colitis flare up       HPI:  He had periumbilical abdominal pain last week radiating to his back. No fever. No n/v. He had loose stool but this has resolved. His pain has resolved. Symptoms lasted a couple of days and have gradually improved. He has a h/o colitis with significant episode 3 years ago requiring ER evaluation while living in [place]. CT at that time showed mild ileus and thickening of distal transverse and descending colon. Treated with cipro and flagyl. He is having flare ups 1-2 times per month with abd pain, loose stool. Flare ups last 24-48 hours and are occurring 1-2 times per month. No trigger. He has had no rectal bleeding in over a year. He was referred to GI but was unable to be contacted to schedule. He has chronic insomnia. His mind races at night. Has difficulty falling asleep. He tried increasing melatonin to 10 mg with no improvement. He has not tried any other medications in the past.  No significant anxiety. BP stable/controlled without medication. Review of Systems   Constitutional: Negative for chills and fever. HENT: Negative for congestion, ear pain and sore throat. Respiratory: Negative for cough, chest tightness, shortness of breath and wheezing. Cardiovascular: Negative for chest pain. Gastrointestinal: Positive for abdominal pain (improved) and diarrhea (improved). Negative for blood in stool, nausea and vomiting. Musculoskeletal: Negative for arthralgias and myalgias. Skin: Negative for rash. Psychiatric/Behavioral: Positive for sleep disturbance.        Past Medical History:   Diagnosis Date    Allergic rhinitis     Anxiety     Colitis     Depression     Essential hypertension        Current Outpatient Medications   Medication Sig Dispense Refill    traZODone (DESYREL) 50 MG tablet Take 1 tablet by mouth nightly as needed for Sleep 30 tablet 3    ondansetron (ZOFRAN) 4 MG tablet Take 1 tablet by mouth 3 times daily as needed for Nausea or Vomiting 20 tablet 0    Melatonin 10 MG TABS Take 1 tablet by mouth nightly as needed (insomnia) 30 tablet 0    albuterol sulfate HFA (VENTOLIN HFA) 108 (90 Base) MCG/ACT inhaler Inhale 2 puffs into the lungs every 6 hours as needed for Wheezing      diclofenac (VOLTAREN) 50 MG EC tablet Take 1 tablet by mouth 2 times daily as needed for Pain (Patient not taking: Reported on 9/21/2021) 60 tablet 2     No current facility-administered medications for this visit. Allergies   Allergen Reactions    Molds & Smuts Shortness Of Breath       Past Surgical History:   Procedure Laterality Date    EYE SURGERY  1996       Social History     Tobacco Use    Smoking status: Former Smoker     Types: Cigarettes    Smokeless tobacco: Never Used   Vaping Use    Vaping Use: Never used   Substance Use Topics    Alcohol use: Not Currently     Comment: social    Drug use: Never       Family History   Problem Relation Age of Onset    No Known Problems Mother     No Known Problems Father     Breast Cancer Paternal Grandmother        /82   Pulse 90   Temp 97.1 °F (36.2 °C) (Temporal)   Resp 18   Ht 5' 7\" (1.702 m)   Wt 224 lb (101.6 kg)   SpO2 97%   BMI 35.08 kg/m²     Physical Exam  Vitals reviewed. Constitutional:       General: He is not in acute distress. Appearance: Normal appearance. He is well-developed. HENT:      Head: Normocephalic. Eyes:      Conjunctiva/sclera: Conjunctivae normal.      Pupils: Pupils are equal, round, and reactive to light. Neck:      Thyroid: No thyromegaly. Vascular: No carotid bruit or JVD. Trachea: No tracheal deviation. Cardiovascular:      Rate and Rhythm: Normal rate and regular rhythm. Heart sounds: Normal heart sounds. No murmur heard.      Pulmonary:      Effort: Pulmonary effort is normal. No respiratory distress. Breath sounds: Normal breath sounds. Abdominal:      General: Abdomen is flat. Bowel sounds are normal. There is no distension. Palpations: Abdomen is soft. There is no mass. Tenderness: There is abdominal tenderness. There is no guarding or rebound. Hernia: No hernia is present. Musculoskeletal:         General: Normal range of motion. Cervical back: Normal range of motion and neck supple. Lymphadenopathy:      Cervical: No cervical adenopathy. Skin:     General: Skin is warm and dry. Findings: No rash. Neurological:      Mental Status: He is alert. Psychiatric:         Mood and Affect: Mood normal.         Behavior: Behavior normal.         Thought Content: Thought content normal.         ASSESSMENT/PLAN:  1. Colitis  -Acute symptoms have resolved. We will continue to monitor.  -Needs GI evaluation due to recurrent episodes. Phone number given to patient, he will call to schedule. He was referred in June. -He will get fasting labs today as previously ordered. 2. Essential hypertension  -Stable, controlled without medication. 3. Primary insomnia  -Trazodone 50 mg nightly as needed. He will report any persistent symptoms or no improvement. Return in about 4 months (around 1/21/2022) for follow up. Frank Fried was seen today for abdominal pain. Diagnoses and all orders for this visit:    Essential hypertension    Colitis    Primary insomnia    Other orders  -     traZODone (DESYREL) 50 MG tablet; Take 1 tablet by mouth nightly as needed for Sleep      There are no discontinued medications. There are no Patient Instructions on file for this visit. Patient voicesunderstanding and agrees to plans along with risks and benefits of plan. Counseling:  Manish delarosa's case, medications and options were discussed in detail. Patient was instructed to call the office if he questionsregarding him treatment.  Should him conditions worsen, he should return to office to be reassessed by TAHIR Vázquez. he Should to go the closest Emergency Department for any emergency. They verbalizedunderstanding the above instructions. Return in about 4 months (around 1/21/2022) for follow up.

## 2021-09-22 PROBLEM — E78.2 MIXED HYPERLIPIDEMIA: Status: ACTIVE | Noted: 2021-09-22

## 2021-09-22 PROBLEM — R74.01 ELEVATED ALT MEASUREMENT: Status: ACTIVE | Noted: 2021-09-22

## 2021-11-05 ENCOUNTER — HOSPITAL ENCOUNTER (EMERGENCY)
Facility: HOSPITAL | Age: 26
Discharge: HOME | End: 2021-11-05
Admitting: EMERGENCY MEDICINE

## 2021-11-05 ENCOUNTER — APPOINTMENT (OUTPATIENT)
Dept: GENERAL RADIOLOGY | Facility: HOSPITAL | Age: 26
End: 2021-11-05

## 2021-11-05 VITALS
TEMPERATURE: 98 F | BODY MASS INDEX: 32.96 KG/M2 | WEIGHT: 210 LBS | RESPIRATION RATE: 16 BRPM | DIASTOLIC BLOOD PRESSURE: 87 MMHG | HEIGHT: 67 IN | OXYGEN SATURATION: 100 % | HEART RATE: 80 BPM | SYSTOLIC BLOOD PRESSURE: 124 MMHG

## 2021-11-05 DIAGNOSIS — E86.0 DEHYDRATION: ICD-10-CM

## 2021-11-05 DIAGNOSIS — E86.9 VOLUME DEPLETION: Primary | ICD-10-CM

## 2021-11-05 LAB
ALBUMIN SERPL-MCNC: 4.4 G/DL (ref 3.5–5.2)
ALBUMIN/GLOB SERPL: 1.4 G/DL
ALP SERPL-CCNC: 57 U/L (ref 39–117)
ALT SERPL W P-5'-P-CCNC: 78 U/L (ref 1–41)
AMPHET+METHAMPHET UR QL: NEGATIVE
AMPHETAMINES UR QL: NEGATIVE
ANION GAP SERPL CALCULATED.3IONS-SCNC: 11 MMOL/L (ref 5–15)
AST SERPL-CCNC: 46 U/L (ref 1–40)
BARBITURATES UR QL SCN: NEGATIVE
BASOPHILS # BLD AUTO: 0.02 10*3/MM3 (ref 0–0.2)
BASOPHILS NFR BLD AUTO: 0.3 % (ref 0–1.5)
BENZODIAZ UR QL SCN: NEGATIVE
BILIRUB SERPL-MCNC: 0.3 MG/DL (ref 0–1.2)
BILIRUB UR QL STRIP: NEGATIVE
BUN SERPL-MCNC: 9 MG/DL (ref 6–20)
BUN/CREAT SERPL: 11.4 (ref 7–25)
BUPRENORPHINE SERPL-MCNC: NEGATIVE NG/ML
CALCIUM SPEC-SCNC: 9.2 MG/DL (ref 8.6–10.5)
CANNABINOIDS SERPL QL: NEGATIVE
CHLORIDE SERPL-SCNC: 103 MMOL/L (ref 98–107)
CLARITY UR: CLEAR
CO2 SERPL-SCNC: 24 MMOL/L (ref 22–29)
COCAINE UR QL: NEGATIVE
COLOR UR: YELLOW
CREAT SERPL-MCNC: 0.79 MG/DL (ref 0.76–1.27)
D DIMER PPP FEU-MCNC: <0.22 MG/L (FEU) (ref 0–0.5)
DEPRECATED RDW RBC AUTO: 44 FL (ref 37–54)
EOSINOPHIL # BLD AUTO: 0.06 10*3/MM3 (ref 0–0.4)
EOSINOPHIL NFR BLD AUTO: 0.9 % (ref 0.3–6.2)
ERYTHROCYTE [DISTWIDTH] IN BLOOD BY AUTOMATED COUNT: 13.8 % (ref 12.3–15.4)
GFR SERPL CREATININE-BSD FRML MDRD: 118 ML/MIN/1.73
GLOBULIN UR ELPH-MCNC: 3.2 GM/DL
GLUCOSE SERPL-MCNC: 95 MG/DL (ref 65–99)
GLUCOSE UR STRIP-MCNC: NEGATIVE MG/DL
HCT VFR BLD AUTO: 47.6 % (ref 37.5–51)
HGB BLD-MCNC: 15.4 G/DL (ref 13–17.7)
HGB UR QL STRIP.AUTO: NEGATIVE
IMM GRANULOCYTES # BLD AUTO: 0.01 10*3/MM3 (ref 0–0.05)
IMM GRANULOCYTES NFR BLD AUTO: 0.1 % (ref 0–0.5)
KETONES UR QL STRIP: NEGATIVE
LEUKOCYTE ESTERASE UR QL STRIP.AUTO: NEGATIVE
LYMPHOCYTES # BLD AUTO: 2.25 10*3/MM3 (ref 0.7–3.1)
LYMPHOCYTES NFR BLD AUTO: 33.4 % (ref 19.6–45.3)
MCH RBC QN AUTO: 28.3 PG (ref 26.6–33)
MCHC RBC AUTO-ENTMCNC: 32.4 G/DL (ref 31.5–35.7)
MCV RBC AUTO: 87.5 FL (ref 79–97)
METHADONE UR QL SCN: NEGATIVE
MONOCYTES # BLD AUTO: 0.67 10*3/MM3 (ref 0.1–0.9)
MONOCYTES NFR BLD AUTO: 9.9 % (ref 5–12)
NEUTROPHILS NFR BLD AUTO: 3.73 10*3/MM3 (ref 1.7–7)
NEUTROPHILS NFR BLD AUTO: 55.4 % (ref 42.7–76)
NITRITE UR QL STRIP: NEGATIVE
NRBC BLD AUTO-RTO: 0 /100 WBC (ref 0–0.2)
OPIATES UR QL: NEGATIVE
OXYCODONE UR QL SCN: NEGATIVE
PCP UR QL SCN: NEGATIVE
PH UR STRIP.AUTO: 5.5 [PH] (ref 5–8)
PLATELET # BLD AUTO: 270 10*3/MM3 (ref 140–450)
PMV BLD AUTO: 11.3 FL (ref 6–12)
POTASSIUM SERPL-SCNC: 3.9 MMOL/L (ref 3.5–5.2)
PROPOXYPH UR QL: NEGATIVE
PROT SERPL-MCNC: 7.6 G/DL (ref 6–8.5)
PROT UR QL STRIP: NEGATIVE
RBC # BLD AUTO: 5.44 10*6/MM3 (ref 4.14–5.8)
SODIUM SERPL-SCNC: 138 MMOL/L (ref 136–145)
SP GR UR STRIP: 1.03 (ref 1–1.03)
TRICYCLICS UR QL SCN: NEGATIVE
TROPONIN T SERPL-MCNC: <0.01 NG/ML (ref 0–0.03)
UROBILINOGEN UR QL STRIP: NORMAL
WBC # BLD AUTO: 6.74 10*3/MM3 (ref 3.4–10.8)

## 2021-11-05 PROCEDURE — 80053 COMPREHEN METABOLIC PANEL: CPT | Performed by: NURSE PRACTITIONER

## 2021-11-05 PROCEDURE — 81003 URINALYSIS AUTO W/O SCOPE: CPT | Performed by: NURSE PRACTITIONER

## 2021-11-05 PROCEDURE — 93010 ELECTROCARDIOGRAM REPORT: CPT | Performed by: INTERNAL MEDICINE

## 2021-11-05 PROCEDURE — 96374 THER/PROPH/DIAG INJ IV PUSH: CPT

## 2021-11-05 PROCEDURE — 99283 EMERGENCY DEPT VISIT LOW MDM: CPT

## 2021-11-05 PROCEDURE — 85379 FIBRIN DEGRADATION QUANT: CPT | Performed by: NURSE PRACTITIONER

## 2021-11-05 PROCEDURE — 93005 ELECTROCARDIOGRAM TRACING: CPT | Performed by: NURSE PRACTITIONER

## 2021-11-05 PROCEDURE — 74022 RADEX COMPL AQT ABD SERIES: CPT

## 2021-11-05 PROCEDURE — 84484 ASSAY OF TROPONIN QUANT: CPT | Performed by: NURSE PRACTITIONER

## 2021-11-05 PROCEDURE — 80306 DRUG TEST PRSMV INSTRMNT: CPT | Performed by: NURSE PRACTITIONER

## 2021-11-05 PROCEDURE — 85025 COMPLETE CBC W/AUTO DIFF WBC: CPT | Performed by: NURSE PRACTITIONER

## 2021-11-05 PROCEDURE — 25010000002 ONDANSETRON PER 1 MG: Performed by: NURSE PRACTITIONER

## 2021-11-05 RX ORDER — ONDANSETRON 4 MG/1
4 TABLET, ORALLY DISINTEGRATING ORAL EVERY 6 HOURS PRN
Qty: 10 TABLET | Refills: 0 | Status: SHIPPED | OUTPATIENT
Start: 2021-11-05 | End: 2022-02-21

## 2021-11-05 RX ORDER — ONDANSETRON 2 MG/ML
4 INJECTION INTRAMUSCULAR; INTRAVENOUS ONCE
Status: COMPLETED | OUTPATIENT
Start: 2021-11-05 | End: 2021-11-05

## 2021-11-05 RX ORDER — SODIUM CHLORIDE 0.9 % (FLUSH) 0.9 %
10 SYRINGE (ML) INJECTION AS NEEDED
Status: DISCONTINUED | OUTPATIENT
Start: 2021-11-05 | End: 2021-11-05 | Stop reason: HOSPADM

## 2021-11-05 RX ADMIN — ONDANSETRON 4 MG: 2 INJECTION INTRAMUSCULAR; INTRAVENOUS at 13:56

## 2021-11-05 RX ADMIN — SODIUM CHLORIDE 1000 ML: 9 INJECTION, SOLUTION INTRAVENOUS at 13:55

## 2021-11-05 NOTE — DISCHARGE INSTRUCTIONS
Return to ER if symptoms worsen   Increase oral fluids  Follow up with one of the [facility] physician groups below to setup primary care. If you have trouble making an appointment, please call the [facility] Nurse Line at [phone]    Dr. Rosa Newell DO, Dr. Bob Poole DO, and ASHLEIGH Mathis  [facility]  [address] [place] [address], [address] [phone]    Dr. Santy Ball MD  [facility] - [address], [address], [place], [place] [address] [phone]    Dr. Olegario Lozano DO, Dr. Emanuel Mendoza DO,  ASHLEIGH Wells, and ASHLEIGH Peterson  [facility] - [address], [address], [place] [address] [phone]     Dr. Nargis Byrd MD, and ASHLEIGH Mccoy  [facility] [address], [place], [place] [address] [phone]    Dr. Steve Childress MD and Dr. Fer Lee MD  [address], [address]  [phone]    Dr. Óscar Jeong MD  [facility]  [address], [address], [place], [place], [address] [phone]    Dr. Gurvinder Hurst MD  [facility]  [address], [place], [address] [phone]

## 2021-11-05 NOTE — ED TRIAGE NOTES
PATIENT PRESENTS WITH CC OF SYNCOPE. PATIENT REPORTS HE WAS IN THE SHOWER WHEN HE BECAME LIGHT HEADED AND WEAK. PATIENT REPORTS HE HAD A SYNCOPAL EPISODE AND THEN WOKE UP IN THE SHOWER. PATIENT REPORTS AFTER HE CHECKED HIS BP AND REPORTS HYPOTENSION OF 80'S SYSTOLIC. PATIENT CURRENTLY REPORTS NO SYMPTOMS AT THIS TIME BUT REPORTS SOME LOWER BACK PAIN AFTER HIS FALL.

## 2021-11-05 NOTE — ED PROVIDER NOTES
"Subjective   Patient is a 26-year-old male that presents to ED with CC of syncope. He states yesterday evening he had a migraine and went to bed. This morning he got up and took a shower. He states that he got lightheaded and had blurry vision. When he woke up he was laying on the floor and the water was cold. Patient states he has had diarrhea x 2 days with one episode of vomiting yesterday. Patient c/o nausea, but no abdominal pain at this time. History of colitis.  He states that this is the 3rd episode of syncope this year. He relates it to when his colitis \"flares up\". He denies chest pain or sob      History provided by:  Patient   used: No        Review of Systems   Constitutional: Positive for fatigue.   HENT: Negative.    Eyes: Negative.    Respiratory: Negative.    Cardiovascular: Negative.    Gastrointestinal: Positive for diarrhea, nausea and vomiting. Negative for abdominal pain.   Endocrine: Negative.    Genitourinary: Negative.    Musculoskeletal: Negative.    Skin: Positive for pallor.   Allergic/Immunologic: Negative.    Neurological: Negative for dizziness and weakness.   Hematological: Negative.    Psychiatric/Behavioral: Negative.        Past Medical History:   Diagnosis Date   • Colitis    • Hypertension    • Seasonal allergies        Allergies   Allergen Reactions   • Molds & Smuts Shortness Of Breath       Past Surgical History:   Procedure Laterality Date   • EYE SURGERY Right        History reviewed. No pertinent family history.    Social History     Socioeconomic History   • Marital status:    Tobacco Use   • Smoking status: Former Smoker     Quit date: 2015     Years since quittin.2   • Smokeless tobacco: Never Used   Substance and Sexual Activity   • Alcohol use: Yes     Comment: occ   • Drug use: No   • Sexual activity: Defer           Objective   Physical Exam  Constitutional:       General: He is not in acute distress.     Appearance: He is not " toxic-appearing.   HENT:      Head: Normocephalic and atraumatic.   Cardiovascular:      Rate and Rhythm: Normal rate and regular rhythm.      Pulses: Normal pulses.      Heart sounds: Normal heart sounds.   Pulmonary:      Effort: Pulmonary effort is normal.      Breath sounds: Normal breath sounds. No wheezing, rhonchi or rales.   Chest:      Chest wall: No tenderness.   Abdominal:      General: Abdomen is flat. Bowel sounds are normal.      Palpations: Abdomen is soft.      Tenderness: There is no abdominal tenderness.   Skin:     General: Skin is warm and dry.      Capillary Refill: Capillary refill takes less than 2 seconds.      Coloration: Skin is pale.   Neurological:      Mental Status: He is alert and oriented to person, place, and time.         Procedures           ED Course  ED Course as of 11/05/21 1717   Fri Nov 05, 2021   1443 Reviewed results of testing with pt. Labs wnl. Abd xray - no acute findings. Pt states that he feels better at this time. No further vomiting or diarrhea since has been in the er. He does feel comfortable going home. Pt states that he feels much better since ivf. Will be sent home shortly in stable condition  [CW]      ED Course User Index  [CW] Noemí Gimenez APRN                                           MDM  Number of Diagnoses or Management Options  Dehydration: minor  Volume depletion: minor     Amount and/or Complexity of Data Reviewed  Clinical lab tests: ordered and reviewed  Tests in the radiology section of CPT®: ordered and reviewed    Patient Progress  Patient progress: stable      Final diagnoses:   Volume depletion   Dehydration       ED Disposition  ED Disposition     ED Disposition Condition Comment    Discharge Stable           Provider, No Known  [place] [id number] [phone]    In 2 days  As needed, For follow up         Medication List      New Prescriptions    ondansetron ODT 4 MG disintegrating tablet  Commonly known as:  ZOFRAN-ODT  Place 1 tablet on the tongue Every 6 (Six) Hours As Needed for Nausea.           Where to Get Your Medications      These medications were sent to [id number] - [place], [place] - [address] AT [facility] [address] - [phone]  - [phone] FX  [address] [place] [address] [place] [address]    Phone: [phone]   · ondansetron ODT 4 MG disintegrating tablet          Noemí Gimenez, APRN  11/05/21 7891

## 2021-11-07 LAB
QT INTERVAL: 390 MS
QTC INTERVAL: 414 MS

## 2021-11-12 ENCOUNTER — TELEPHONE (OUTPATIENT)
Dept: FAMILY MEDICINE CLINIC | Age: 26
End: 2021-11-12

## 2021-11-12 DIAGNOSIS — K52.9 COLITIS: Primary | ICD-10-CM

## 2021-11-12 NOTE — TELEPHONE ENCOUNTER
I can fill out his paperwork for temporary accomodations but he MUST see GI for evaluation. He was referred in June and they could not reach him. We gave him the number to call to schedule but it looks like he has not done that. He needs GI evaluation for this since it is interfering with his job.

## 2022-02-21 PROCEDURE — U0004 COV-19 TEST NON-CDC HGH THRU: HCPCS | Performed by: NURSE PRACTITIONER

## 2022-03-02 ENCOUNTER — OFFICE VISIT (OUTPATIENT)
Dept: FAMILY MEDICINE CLINIC | Age: 27
End: 2022-03-02
Payer: COMMERCIAL

## 2022-03-02 VITALS
OXYGEN SATURATION: 99 % | WEIGHT: 223 LBS | DIASTOLIC BLOOD PRESSURE: 88 MMHG | BODY MASS INDEX: 35 KG/M2 | TEMPERATURE: 97.8 F | HEIGHT: 67 IN | SYSTOLIC BLOOD PRESSURE: 126 MMHG | RESPIRATION RATE: 18 BRPM | HEART RATE: 78 BPM

## 2022-03-02 DIAGNOSIS — I10 ESSENTIAL HYPERTENSION: ICD-10-CM

## 2022-03-02 DIAGNOSIS — J06.9 VIRAL URI: ICD-10-CM

## 2022-03-02 DIAGNOSIS — F41.9 ANXIETY: ICD-10-CM

## 2022-03-02 DIAGNOSIS — Z23 NEED FOR VACCINATION: Primary | ICD-10-CM

## 2022-03-02 DIAGNOSIS — R74.8 ELEVATED LIVER ENZYMES: ICD-10-CM

## 2022-03-02 PROCEDURE — 90715 TDAP VACCINE 7 YRS/> IM: CPT | Performed by: NURSE PRACTITIONER

## 2022-03-02 PROCEDURE — 99214 OFFICE O/P EST MOD 30 MIN: CPT | Performed by: NURSE PRACTITIONER

## 2022-03-02 PROCEDURE — 90471 IMMUNIZATION ADMIN: CPT | Performed by: NURSE PRACTITIONER

## 2022-03-02 RX ORDER — FLUTICASONE PROPIONATE 50 MCG
2 SPRAY, SUSPENSION (ML) NASAL DAILY
Qty: 16 G | Refills: 3 | Status: SHIPPED | OUTPATIENT
Start: 2022-03-02

## 2022-03-02 RX ORDER — BUSPIRONE HYDROCHLORIDE 5 MG/1
5 TABLET ORAL 2 TIMES DAILY PRN
Qty: 60 TABLET | Refills: 2 | Status: SHIPPED | OUTPATIENT
Start: 2022-03-02 | End: 2022-03-07 | Stop reason: SDUPTHER

## 2022-03-02 ASSESSMENT — ENCOUNTER SYMPTOMS
SHORTNESS OF BREATH: 0
DIARRHEA: 0
NAUSEA: 0
CHEST TIGHTNESS: 0
COUGH: 0
SORE THROAT: 0
WHEEZING: 0
ABDOMINAL PAIN: 0

## 2022-03-02 ASSESSMENT — PATIENT HEALTH QUESTIONNAIRE - PHQ9
1. LITTLE INTEREST OR PLEASURE IN DOING THINGS: 1
2. FEELING DOWN, DEPRESSED OR HOPELESS: 0
SUM OF ALL RESPONSES TO PHQ QUESTIONS 1-9: 1
SUM OF ALL RESPONSES TO PHQ QUESTIONS 1-9: 1
SUM OF ALL RESPONSES TO PHQ9 QUESTIONS 1 & 2: 1
SUM OF ALL RESPONSES TO PHQ QUESTIONS 1-9: 1
SUM OF ALL RESPONSES TO PHQ QUESTIONS 1-9: 1

## 2022-03-02 NOTE — PROGRESS NOTES
Verenice Goodwin is a 31 y.o. male who presents today for  Chief Complaint   Patient presents with    Follow-up     ref to psych       HPI:  Hypertension is stable without medication. He is not checking regularly at home. He has had increased anxiety. He has a hx of anxiety and depression. Currently he does not have any depression. No SI or HI. He states he has significant social anxiety. He would like a letter for an emotional support animal.  He has tried bupropion in the past which made him feel like a \"zombie. \"  He has also tried sertraline which made him irritable. Insomnia is stable on melatonin as needed. He is no longer taking trazodone. He had a viral bronchitis last week and was treated per urgent care at [facility].  Cough has improved. He was taking cough medication. Now only taking Mucinex. He continues to have some nasal congestion and postnasal drainage. Lab in September showed elevated ALT 76. He was seen in at [facility] ER in November with ALT stable at 78 and AST elevated at 46. He does not drink alcohol. He has had no abdominal pain or nausea/vomiting. ER visit in November was for syncope. He has had no recurrence of this. No dizziness or lightheadedness. No headache or visual disturbance. Review of Systems   Constitutional: Negative for chills and fever. HENT: Positive for congestion and postnasal drip. Negative for ear pain and sore throat. Respiratory: Negative for cough, chest tightness, shortness of breath and wheezing. Cardiovascular: Negative for chest pain. Gastrointestinal: Negative for abdominal pain, diarrhea and nausea. Musculoskeletal: Negative for arthralgias and myalgias. Skin: Negative for rash. Psychiatric/Behavioral: Negative for dysphoric mood and suicidal ideas. The patient is nervous/anxious.         Past Medical History:   Diagnosis Date    Allergic rhinitis     Anxiety     Colitis     Depression     Essential hypertension        Current Outpatient Medications   Medication Sig Dispense Refill    busPIRone (BUSPAR) 5 MG tablet Take 1 tablet by mouth 2 times daily as needed (anxiety) 60 tablet 2    fluticasone (FLONASE) 50 MCG/ACT nasal spray 2 sprays by Each Nostril route daily 16 g 3    Melatonin 10 MG TABS Take 1 tablet by mouth nightly as needed (insomnia) 30 tablet 0    albuterol sulfate HFA (VENTOLIN HFA) 108 (90 Base) MCG/ACT inhaler Inhale 2 puffs into the lungs every 6 hours as needed for Wheezing       No current facility-administered medications for this visit. Allergies   Allergen Reactions    Molds & Smuts Shortness Of Breath       Past Surgical History:   Procedure Laterality Date    EYE SURGERY  1996       Social History     Tobacco Use    Smoking status: Former Smoker     Types: Cigarettes    Smokeless tobacco: Never Used   Vaping Use    Vaping Use: Never used   Substance Use Topics    Alcohol use: Not Currently     Comment: social    Drug use: Never       Family History   Problem Relation Age of Onset    No Known Problems Mother     No Known Problems Father     Breast Cancer Paternal Grandmother        /88   Pulse 78   Temp 97.8 °F (36.6 °C) (Temporal)   Resp 18   Ht 5' 7\" (1.702 m)   Wt 223 lb (101.2 kg)   SpO2 99%   BMI 34.93 kg/m²     Physical Exam  Vitals reviewed. Constitutional:       General: He is not in acute distress. Appearance: Normal appearance. He is well-developed. HENT:      Head: Normocephalic. Right Ear: External ear normal.      Left Ear: External ear normal.      Ears:      Comments: TMs mildly dull, no erythema     Nose: Nose normal.      Mouth/Throat:      Mouth: Mucous membranes are moist.      Pharynx: No oropharyngeal exudate or posterior oropharyngeal erythema. Eyes:      Conjunctiva/sclera: Conjunctivae normal.      Pupils: Pupils are equal, round, and reactive to light. Neck:      Thyroid: No thyromegaly.       Vascular: No carotid bruit or JVD. Trachea: No tracheal deviation. Cardiovascular:      Rate and Rhythm: Normal rate and regular rhythm. Heart sounds: Normal heart sounds. No murmur heard. Pulmonary:      Effort: Pulmonary effort is normal. No respiratory distress. Breath sounds: Normal breath sounds. Musculoskeletal:         General: Normal range of motion. Cervical back: Normal range of motion and neck supple. Lymphadenopathy:      Cervical: No cervical adenopathy. Skin:     General: Skin is warm and dry. Findings: No rash. Neurological:      Mental Status: He is alert. Psychiatric:         Mood and Affect: Mood normal.         Behavior: Behavior normal.         Thought Content: Thought content normal.         ASSESSMENT/PLAN:  1. Anxiety  -Buspirone 5 mg bid prn. SE profile reviewed. -Refer to psychiatry for evaluation and recommendations. He is requesting a letter for an emotional support animal which needs to be discussed with psychiatry. - [facility], [facility], [facility], [place]    2. Viral URI  -Improving. Continue mucinex prn  -Flonase 2 sprays per nostril daily prn.    3. Elevated liver enzymes  -Repeat CMP today. Check hep panel.  -Likely secondary to weight, diet. Needs to work on weight loss and low fat diet. - Comprehensive Metabolic Panel; Future  - Hepatitis Panel, Acute; Future    4. Essential hypertension  -Stable without medication. Advised to monitor periodically at home and report any elevated readings >140/90.    5. Need for vaccination    - Tdap (age 6y and older)  ([employer])         Return in about 2 months (around 5/2/2022) for follow up. Kori Gabriel was seen today for follow-up. Diagnoses and all orders for this visit:    Need for vaccination  -     Tdap (age 6y and older)  ([employer])    [facility], [facility], [facility], [place]    Viral URI    Elevated liver enzymes  -     Comprehensive Metabolic Panel;  Future  -     Hepatitis Panel, Acute; Future    Essential hypertension    Other orders  -     busPIRone (BUSPAR) 5 MG tablet; Take 1 tablet by mouth 2 times daily as needed (anxiety)  -     fluticasone (FLONASE) 50 MCG/ACT nasal spray; 2 sprays by Each Nostril route daily      Medications Discontinued During This Encounter   Medication Reason    traZODone (DESYREL) 50 MG tablet Therapy completed    diclofenac (VOLTAREN) 50 MG EC tablet LIST CLEANUP    ondansetron (ZOFRAN) 4 MG tablet LIST CLEANUP     There are no Patient Instructions on file for this visit. Patient voicesunderstanding and agrees to plans along with risks and benefits of plan. Counseling:  Phil Fabjosé Salvador's case, medications and options were discussed in detail. Patient was instructed to call the office if he questionsregarding him treatment. Should him conditions worsen, he should return to office to be reassessed by TAHIR Mendez. he Should to go the closest Emergency Department for any emergency. They verbalizedunderstanding the above instructions. Return in about 2 months (around 5/2/2022) for follow up.

## 2022-03-02 NOTE — PROGRESS NOTES
Immunizations Administered     Name Date Dose Route    Tdap (Boostrix, Adacel) 3/2/2022 0.5 mL Intramuscular    Site: Deltoid- Left    Lot: Conrad Evans    NDC: 61764-871-75

## 2022-03-08 RX ORDER — BUSPIRONE HYDROCHLORIDE 5 MG/1
5 TABLET ORAL 2 TIMES DAILY PRN
Qty: 60 TABLET | Refills: 2 | Status: SHIPPED | OUTPATIENT
Start: 2022-03-08 | End: 2022-04-07

## 2022-03-08 NOTE — TELEPHONE ENCOUNTER
Brendon called requesting a refill of the below medication which has been pended for you:     Requested Prescriptions     Pending Prescriptions Disp Refills    busPIRone (BUSPAR) 5 MG tablet 60 tablet 2     Sig: Take 1 tablet by mouth 2 times daily as needed (anxiety)       Last Appointment Date: 3/2/2022  Next Appointment Date: 5/2/2022    Allergies   Allergen Reactions    Molds & Smuts Shortness Of Breath

## 2022-04-06 ENCOUNTER — OFFICE VISIT (OUTPATIENT)
Dept: FAMILY MEDICINE CLINIC | Age: 27
End: 2022-04-06
Payer: COMMERCIAL

## 2022-04-06 VITALS
OXYGEN SATURATION: 97 % | DIASTOLIC BLOOD PRESSURE: 86 MMHG | RESPIRATION RATE: 16 BRPM | BODY MASS INDEX: 35 KG/M2 | SYSTOLIC BLOOD PRESSURE: 124 MMHG | HEART RATE: 93 BPM | HEIGHT: 67 IN | WEIGHT: 223 LBS | TEMPERATURE: 97.7 F

## 2022-04-06 DIAGNOSIS — J20.9 ACUTE BRONCHITIS, UNSPECIFIED ORGANISM: Primary | ICD-10-CM

## 2022-04-06 DIAGNOSIS — J30.1 SEASONAL ALLERGIC RHINITIS DUE TO POLLEN: ICD-10-CM

## 2022-04-06 DIAGNOSIS — I10 ESSENTIAL HYPERTENSION: ICD-10-CM

## 2022-04-06 PROCEDURE — 99214 OFFICE O/P EST MOD 30 MIN: CPT | Performed by: NURSE PRACTITIONER

## 2022-04-06 RX ORDER — AMLODIPINE BESYLATE 5 MG/1
5 TABLET ORAL DAILY
Qty: 30 TABLET | Refills: 5 | Status: SHIPPED | OUTPATIENT
Start: 2022-04-06 | End: 2022-04-27

## 2022-04-06 RX ORDER — METHYLPREDNISOLONE 4 MG/1
TABLET ORAL
Qty: 1 KIT | Refills: 0 | Status: SHIPPED | OUTPATIENT
Start: 2022-04-06 | End: 2022-04-12

## 2022-04-06 RX ORDER — LORATADINE 10 MG/1
10 TABLET ORAL DAILY
Qty: 30 TABLET | Refills: 5 | Status: SHIPPED | OUTPATIENT
Start: 2022-04-06

## 2022-04-06 RX ORDER — AZITHROMYCIN 250 MG/1
250 TABLET, FILM COATED ORAL SEE ADMIN INSTRUCTIONS
Qty: 6 TABLET | Refills: 0 | Status: SHIPPED | OUTPATIENT
Start: 2022-04-06 | End: 2022-04-11

## 2022-04-06 RX ORDER — BENZONATATE 200 MG/1
200 CAPSULE ORAL 3 TIMES DAILY PRN
Qty: 30 CAPSULE | Refills: 0 | Status: SHIPPED | OUTPATIENT
Start: 2022-04-06 | End: 2022-04-13

## 2022-04-06 ASSESSMENT — ENCOUNTER SYMPTOMS
DIARRHEA: 0
SORE THROAT: 0
COUGH: 1
SHORTNESS OF BREATH: 0
WHEEZING: 1
CHEST TIGHTNESS: 0
ABDOMINAL PAIN: 0
NAUSEA: 0

## 2022-04-06 NOTE — PROGRESS NOTES
Ying Gutierrez is a 31 y.o. male who presents today for  Chief Complaint   Patient presents with    Cough       HPI:  He was treated for viral bronchitis in February at [facility] urgent care. He had some improvement but cough never resolved. He has developed purulent sputum at times, other times clear. No fever or chills. He has had more wheezing recently. He is using his rescue inhaler every day up to twice daily recently which is unusual for him. He is taking Mucinex with no significant improvement. He is using Flonase but feels it is not helping. He has chronic allergies as well which have been slightly worse with nasal congestion, postnasal drainage. BP has been worse recently. He had been stable off medication. Now SBP is running 140s or higher at home. He could not tolerate lisinopril or losartan in the past, made him feel clammy and break out in a sweat. Review of Systems   Constitutional: Negative for chills and fever. HENT: Positive for congestion and postnasal drip. Negative for ear pain and sore throat. Respiratory: Positive for cough and wheezing. Negative for chest tightness and shortness of breath. Cardiovascular: Negative for chest pain. Gastrointestinal: Negative for abdominal pain, diarrhea and nausea. Musculoskeletal: Negative for arthralgias and myalgias. Skin: Negative for rash. Past Medical History:   Diagnosis Date    Allergic rhinitis     Anxiety     Colitis     Depression     Essential hypertension        Current Outpatient Medications   Medication Sig Dispense Refill    azithromycin (ZITHROMAX) 250 MG tablet Take 1 tablet by mouth See Admin Instructions for 5 days 500mg on day 1 followed by 250mg on days 2 - 5 6 tablet 0    methylPREDNISolone (MEDROL DOSEPACK) 4 MG tablet Take by mouth.  1 kit 0    loratadine (CLARITIN) 10 MG tablet Take 1 tablet by mouth daily 30 tablet 5    amLODIPine (NORVASC) 5 MG tablet Take 1 tablet by mouth daily 30 tablet 5    benzonatate (TESSALON) 200 MG capsule Take 1 capsule by mouth 3 times daily as needed for Cough 30 capsule 0    busPIRone (BUSPAR) 5 MG tablet Take 1 tablet by mouth 2 times daily as needed (anxiety) 60 tablet 2    fluticasone (FLONASE) 50 MCG/ACT nasal spray 2 sprays by Each Nostril route daily 16 g 3    Melatonin 10 MG TABS Take 1 tablet by mouth nightly as needed (insomnia) 30 tablet 0    albuterol sulfate HFA (VENTOLIN HFA) 108 (90 Base) MCG/ACT inhaler Inhale 2 puffs into the lungs every 6 hours as needed for Wheezing       No current facility-administered medications for this visit. Allergies   Allergen Reactions    Molds & Smuts Shortness Of Breath       Past Surgical History:   Procedure Laterality Date    EYE SURGERY  1996       Social History     Tobacco Use    Smoking status: Former Smoker     Types: Cigarettes    Smokeless tobacco: Never Used   Vaping Use    Vaping Use: Never used   Substance Use Topics    Alcohol use: Not Currently     Comment: social    Drug use: Never       Family History   Problem Relation Age of Onset    No Known Problems Mother     No Known Problems Father     Breast Cancer Paternal Grandmother        /86   Pulse 93   Temp 97.7 °F (36.5 °C) (Temporal)   Resp 16   Ht 5' 7\" (1.702 m)   Wt 223 lb (101.2 kg)   SpO2 97%   BMI 34.93 kg/m²     Physical Exam  Vitals reviewed. Constitutional:       General: He is not in acute distress. Appearance: Normal appearance. He is well-developed. HENT:      Head: Normocephalic. Right Ear: Tympanic membrane and external ear normal.      Left Ear: Tympanic membrane and external ear normal.      Nose: Nose normal.      Mouth/Throat:      Mouth: Mucous membranes are moist.      Pharynx: Posterior oropharyngeal erythema (mild postpharyngeal erythema, no exudate) present. No oropharyngeal exudate.    Eyes:      Conjunctiva/sclera: Conjunctivae normal.      Pupils: Pupils are equal, round, and reactive to light. Neck:      Thyroid: No thyromegaly. Vascular: No carotid bruit or JVD. Trachea: No tracheal deviation. Cardiovascular:      Rate and Rhythm: Normal rate and regular rhythm. Heart sounds: Normal heart sounds. No murmur heard. Pulmonary:      Effort: Pulmonary effort is normal. No respiratory distress. Breath sounds: Normal breath sounds. Musculoskeletal:         General: Normal range of motion. Cervical back: Normal range of motion and neck supple. Lymphadenopathy:      Cervical: No cervical adenopathy. Skin:     General: Skin is warm and dry. Findings: No rash. Neurological:      Mental Status: He is alert. Psychiatric:         Mood and Affect: Mood normal.         Behavior: Behavior normal.         Thought Content: Thought content normal.         ASSESSMENT/PLAN:  1. Acute bronchitis, unspecified organism  -Zpak, MDP  -Tessalon 200 mg tid prn cough  -May continue mucinex prn.  - azithromycin (ZITHROMAX) 250 MG tablet; Take 1 tablet by mouth See Admin Instructions for 5 days 500mg on day 1 followed by 250mg on days 2 - 5  Dispense: 6 tablet; Refill: 0  - methylPREDNISolone (MEDROL DOSEPACK) 4 MG tablet; Take by mouth. Dispense: 1 kit; Refill: 0    2. Essential hypertension  -Uncontrolled at home. Start amlodipine 5 mg daily. Has been intolerant of lisinopril and losartan.  -Continue to monitor bp at home and report any persistently elevated readings >140/90.  -Reassess in 3 weeks    3. Seasonal allergic rhinitis due to pollen  -Start claritin 10 mg daily  -Continue flonase 2 sprays per nostril daily. Return in about 3 weeks (around 4/27/2022) for follow up. Gil was seen today for cough. Diagnoses and all orders for this visit:    Acute bronchitis, unspecified organism  -     azithromycin (ZITHROMAX) 250 MG tablet;  Take 1 tablet by mouth See Admin Instructions for 5 days 500mg on day 1 followed by 250mg on days 2 - 5  - methylPREDNISolone (MEDROL DOSEPACK) 4 MG tablet; Take by mouth. Essential hypertension    Seasonal allergic rhinitis due to pollen    Other orders  -     loratadine (CLARITIN) 10 MG tablet; Take 1 tablet by mouth daily  -     amLODIPine (NORVASC) 5 MG tablet; Take 1 tablet by mouth daily  -     benzonatate (TESSALON) 200 MG capsule; Take 1 capsule by mouth 3 times daily as needed for Cough      There are no discontinued medications. There are no Patient Instructions on file for this visit. Patient voicesunderstanding and agrees to plans along with risks and benefits of plan. Counseling:  Tonymarleny Salvador's case, medications and options were discussed in detail. Patient was instructed to call the office if he questionsregarding him treatment. Should him conditions worsen, he should return to office to be reassessed by TAHIR Killian. he Should to go the closest Emergency Department for any emergency. They verbalizedunderstanding the above instructions. Return in about 3 weeks (around 4/27/2022) for follow up.

## 2022-04-27 ENCOUNTER — OFFICE VISIT (OUTPATIENT)
Dept: FAMILY MEDICINE CLINIC | Age: 27
End: 2022-04-27
Payer: COMMERCIAL

## 2022-04-27 VITALS
BODY MASS INDEX: 34.77 KG/M2 | DIASTOLIC BLOOD PRESSURE: 78 MMHG | WEIGHT: 222 LBS | TEMPERATURE: 98.6 F | HEART RATE: 88 BPM | SYSTOLIC BLOOD PRESSURE: 124 MMHG | RESPIRATION RATE: 18 BRPM | OXYGEN SATURATION: 98 %

## 2022-04-27 DIAGNOSIS — L23.9 ALLERGIC CONTACT DERMATITIS, UNSPECIFIED TRIGGER: ICD-10-CM

## 2022-04-27 DIAGNOSIS — R00.0 TACHYCARDIA: Primary | ICD-10-CM

## 2022-04-27 DIAGNOSIS — I10 ESSENTIAL HYPERTENSION: ICD-10-CM

## 2022-04-27 PROCEDURE — 99214 OFFICE O/P EST MOD 30 MIN: CPT | Performed by: NURSE PRACTITIONER

## 2022-04-27 RX ORDER — PREDNISONE 20 MG/1
TABLET ORAL
Qty: 12 TABLET | Refills: 0 | Status: SHIPPED | OUTPATIENT
Start: 2022-04-27 | End: 2022-05-03

## 2022-04-27 ASSESSMENT — ENCOUNTER SYMPTOMS
DIARRHEA: 0
COUGH: 0
WHEEZING: 0
SHORTNESS OF BREATH: 0
ABDOMINAL PAIN: 0
CHEST TIGHTNESS: 0
NAUSEA: 0
SORE THROAT: 0

## 2022-04-27 NOTE — PROGRESS NOTES
Yobani Gamboa is a 31 y.o. male who presents today for  Chief Complaint   Patient presents with    Follow-up       HPI:  Here for follow-up on hypertension. Amlodipine added at last visit. He states BP was running higher while taking it running 140s/100 at highest.  He stopped taking it after a couple of weeks. Since then BP has been running 120s/70s-80s, occasionally 862H systolic. He mentions that HR has been elevated per his apple watch typically running 90s-100. He states this is actually been going on for well over 6 months. Average has been around 86 but running anywhere from 68 to 142 bpm.  No chest pain or shortness of breath. He has felt heart racing at times. No significant palpitations. Thyroid testing in September was normal.  Symptoms were ongoing prior to that. He had a massage last week and has had a reaction to the oil used. He has had a papular pruritic rash over his back. He is not currently taking an antihistamine. He has tried hydrocortisone cream with minimal improvement. Review of Systems   Constitutional: Negative for chills and fever. HENT: Negative for congestion, ear pain and sore throat. Respiratory: Negative for cough, chest tightness, shortness of breath and wheezing. Cardiovascular: Negative for chest pain. Tachycardia   Gastrointestinal: Negative for abdominal pain, diarrhea and nausea. Musculoskeletal: Negative for arthralgias and myalgias. Skin: Positive for rash. Past Medical History:   Diagnosis Date    Allergic rhinitis     Anxiety     Colitis     Depression     Essential hypertension        Current Outpatient Medications   Medication Sig Dispense Refill    predniSONE (DELTASONE) 20 MG tablet Take 3 tablets by mouth daily for 2 days, THEN 2 tablets daily for 2 days, THEN 1 tablet daily for 2 days.  12 tablet 0    loratadine (CLARITIN) 10 MG tablet Take 1 tablet by mouth daily 30 tablet 5    fluticasone (FLONASE) 50 MCG/ACT nasal spray 2 sprays by Each Nostril route daily 16 g 3    Melatonin 10 MG TABS Take 1 tablet by mouth nightly as needed (insomnia) 30 tablet 0    albuterol sulfate HFA (VENTOLIN HFA) 108 (90 Base) MCG/ACT inhaler Inhale 2 puffs into the lungs every 6 hours as needed for Wheezing       No current facility-administered medications for this visit. Allergies   Allergen Reactions    Molds & Smuts Shortness Of Breath       Past Surgical History:   Procedure Laterality Date    EYE SURGERY  1996       Social History     Tobacco Use    Smoking status: Former Smoker     Types: Cigarettes    Smokeless tobacco: Never Used   Vaping Use    Vaping Use: Never used   Substance Use Topics    Alcohol use: Not Currently     Comment: social    Drug use: Never       Family History   Problem Relation Age of Onset    No Known Problems Mother     No Known Problems Father     Breast Cancer Paternal Grandmother        /78   Pulse 88   Temp 98.6 °F (37 °C) (Temporal)   Resp 18   Wt 222 lb (100.7 kg)   SpO2 98%   BMI 34.77 kg/m²     Physical Exam  Vitals reviewed. Constitutional:       General: He is not in acute distress. Appearance: Normal appearance. He is well-developed. HENT:      Head: Normocephalic. Eyes:      Conjunctiva/sclera: Conjunctivae normal.      Pupils: Pupils are equal, round, and reactive to light. Neck:      Thyroid: No thyromegaly. Vascular: No carotid bruit or JVD. Trachea: No tracheal deviation. Cardiovascular:      Rate and Rhythm: Normal rate and regular rhythm. Heart sounds: Normal heart sounds. No murmur heard. Pulmonary:      Effort: Pulmonary effort is normal. No respiratory distress. Breath sounds: Normal breath sounds. Musculoskeletal:         General: Normal range of motion. Cervical back: Normal range of motion and neck supple. Lymphadenopathy:      Cervical: No cervical adenopathy. Skin:     General: Skin is warm and dry. Findings: No rash. Neurological:      Mental Status: He is alert. Psychiatric:         Mood and Affect: Mood normal.         Behavior: Behavior normal.         Thought Content: Thought content normal.         ASSESSMENT/PLAN:  1. Essential hypertension  -Currently stable, controlled. We will hold off on medication at this time. Discussed low-dose metoprolol if needed. Continue to monitor at home, report any persistently elevated readings >140/90.    2. Tachycardia  -Zio patch  -Consider metoprolol as above if needed. -Reassess in 1 month, sooner with problems.  - Longterm Continuous Cardiac Event Monitor; Future    3. Allergic contact dermatitis, unspecified trigger  -Prednisone 60/40/20 mg x 2 days each  -Advised to start back on loratadine 10 mg daily  -Benadryl 25 to 50 mg nightly as needed for pruritus. Return for follow up. Melissa Luque was seen today for follow-up. Diagnoses and all orders for this visit:    Tachycardia  -     Longterm Continuous Cardiac Event Monitor; Future    Essential hypertension    Allergic contact dermatitis, unspecified trigger    Other orders  -     predniSONE (DELTASONE) 20 MG tablet; Take 3 tablets by mouth daily for 2 days, THEN 2 tablets daily for 2 days, THEN 1 tablet daily for 2 days. Medications Discontinued During This Encounter   Medication Reason    amLODIPine (NORVASC) 5 MG tablet LIST CLEANUP     Patient Instructions   Benadryl (diphenhydramine) 25-50 mg nightly as needed for itching      Patient voicesunderstanding and agrees to plans along with risks and benefits of plan. Counseling:  Stan Salvador's case, medications and options were discussed in detail. Patient was instructed to call the office if he questionsregarding him treatment. Should him conditions worsen, he should return to office to be reassessed by TAHIR Ruelas. he Should to go the closest Emergency Department for any emergency.  They verbalizedunderstanding the above instructions. Return for follow up.

## 2022-05-06 ENCOUNTER — HOSPITAL ENCOUNTER (OUTPATIENT)
Dept: NON INVASIVE DIAGNOSTICS | Age: 27
Discharge: HOME | End: 2022-05-06
Payer: COMMERCIAL

## 2022-05-06 DIAGNOSIS — R00.0 TACHYCARDIA: ICD-10-CM

## 2022-05-06 PROCEDURE — 93246 EXT ECG>7D<15D RECORDING: CPT

## 2022-05-13 ENCOUNTER — TELEPHONE (OUTPATIENT)
Dept: PSYCHIATRY | Age: 27
End: 2022-05-13

## 2022-05-13 NOTE — TELEPHONE ENCOUNTER
Called pt for appointment reminder.     -left voicemail, requesting a return call    Electronically signed by Curtis Cano MA on 5/13/2022 at 2:30 PM

## 2022-05-16 ENCOUNTER — TELEPHONE (OUTPATIENT)
Dept: PSYCHIATRY | Age: 27
End: 2022-05-16

## 2022-05-16 ENCOUNTER — OFFICE VISIT (OUTPATIENT)
Dept: PSYCHIATRY | Age: 27
End: 2022-05-16
Payer: COMMERCIAL

## 2022-05-16 DIAGNOSIS — F41.1 GAD (GENERALIZED ANXIETY DISORDER): Primary | ICD-10-CM

## 2022-05-16 PROCEDURE — 99215 OFFICE O/P EST HI 40 MIN: CPT | Performed by: NURSE PRACTITIONER

## 2022-05-16 RX ORDER — BUSPIRONE HYDROCHLORIDE 5 MG/1
5 TABLET ORAL 3 TIMES DAILY
Qty: 90 TABLET | Refills: 1 | Status: SHIPPED | OUTPATIENT
Start: 2022-05-16

## 2022-05-16 ASSESSMENT — PATIENT HEALTH QUESTIONNAIRE - PHQ9
2. FEELING DOWN, DEPRESSED OR HOPELESS: 0
SUM OF ALL RESPONSES TO PHQ QUESTIONS 1-9: 0
1. LITTLE INTEREST OR PLEASURE IN DOING THINGS: 0
SUM OF ALL RESPONSES TO PHQ QUESTIONS 1-9: 0
SUM OF ALL RESPONSES TO PHQ QUESTIONS 1-9: 0
SUM OF ALL RESPONSES TO PHQ9 QUESTIONS 1 & 2: 0
SUM OF ALL RESPONSES TO PHQ QUESTIONS 1-9: 0

## 2022-05-16 ASSESSMENT — ANXIETY QUESTIONNAIRES
1. FEELING NERVOUS, ANXIOUS, OR ON EDGE: 3
3. WORRYING TOO MUCH ABOUT DIFFERENT THINGS: 0
IF YOU CHECKED OFF ANY PROBLEMS ON THIS QUESTIONNAIRE, HOW DIFFICULT HAVE THESE PROBLEMS MADE IT FOR YOU TO DO YOUR WORK, TAKE CARE OF THINGS AT HOME, OR GET ALONG WITH OTHER PEOPLE: SOMEWHAT DIFFICULT
GAD7 TOTAL SCORE: 6
7. FEELING AFRAID AS IF SOMETHING AWFUL MIGHT HAPPEN: 0
5. BEING SO RESTLESS THAT IT IS HARD TO SIT STILL: 0
6. BECOMING EASILY ANNOYED OR IRRITABLE: 1
2. NOT BEING ABLE TO STOP OR CONTROL WORRYING: 1
4. TROUBLE RELAXING: 1

## 2022-05-16 NOTE — TELEPHONE ENCOUNTER
Pt canceled their appt through [facility] for today 5/16 with Adan     Called to see if they wanted to reschedule  No answer and LVM.     Electronically signed by Beryle Barrow, MA on 5/16/2022 at 2:57 PM
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
## 2022-06-17 ENCOUNTER — TELEPHONE (OUTPATIENT)
Dept: PSYCHIATRY | Age: 27
End: 2022-06-17

## 2022-06-17 NOTE — TELEPHONE ENCOUNTER
Called pt for appointment reminder.     -left voicemail, requesting a return call      Electronically signed by Leander Gale MA on 6/17/2022 at 1:20 PM

## 2022-06-18 ENCOUNTER — OFFICE VISIT (OUTPATIENT)
Age: 27
End: 2022-06-18
Payer: COMMERCIAL

## 2022-06-18 VITALS
OXYGEN SATURATION: 100 % | BODY MASS INDEX: 34.21 KG/M2 | SYSTOLIC BLOOD PRESSURE: 128 MMHG | WEIGHT: 218 LBS | RESPIRATION RATE: 18 BRPM | TEMPERATURE: 99.1 F | DIASTOLIC BLOOD PRESSURE: 80 MMHG | HEART RATE: 86 BPM | HEIGHT: 67 IN

## 2022-06-18 DIAGNOSIS — J02.9 SORE THROAT: ICD-10-CM

## 2022-06-18 DIAGNOSIS — J02.0 STREP THROAT: Primary | ICD-10-CM

## 2022-06-18 LAB — S PYO AG THROAT QL: POSITIVE

## 2022-06-18 PROCEDURE — 99213 OFFICE O/P EST LOW 20 MIN: CPT | Performed by: NURSE PRACTITIONER

## 2022-06-18 PROCEDURE — 87880 STREP A ASSAY W/OPTIC: CPT | Performed by: NURSE PRACTITIONER

## 2022-06-18 RX ORDER — METHYLPREDNISOLONE 4 MG/1
TABLET ORAL
Qty: 1 KIT | Refills: 0 | Status: SHIPPED | OUTPATIENT
Start: 2022-06-18 | End: 2022-06-24

## 2022-06-18 RX ORDER — AMOXICILLIN 500 MG/1
500 CAPSULE ORAL 2 TIMES DAILY
Qty: 20 CAPSULE | Refills: 0 | Status: SHIPPED | OUTPATIENT
Start: 2022-06-18 | End: 2022-06-28

## 2022-06-18 ASSESSMENT — ENCOUNTER SYMPTOMS
EYES NEGATIVE: 1
SORE THROAT: 1
GASTROINTESTINAL NEGATIVE: 1
RESPIRATORY NEGATIVE: 1
ALLERGIC/IMMUNOLOGIC NEGATIVE: 1

## 2022-06-18 NOTE — PROGRESS NOTES
[address]  [address] [address] [place] [address]  Dept: [phone]  Dept Fax: [phone]  Loc: [phone]    Nadege Hurst is a 28 y.o. male who presents today for his medical conditions/complaints as noted below. Nadege Hurst is c/o of Pharyngitis, Otalgia, and Fever        HPI:   He presents with sore throat, painful swallowing, and fever since Monday. He reports mild vomiting and diarrhea. States he has taken Tylenol and cold and flu medicine with minimal relief.    HPI   Chief Complaint   Patient presents with    Pharyngitis    Otalgia    Fever     Past Medical History:   Diagnosis Date    Allergic rhinitis     Anxiety     Colitis     Depression     Essential hypertension       Past Surgical History:   Procedure Laterality Date    EYE SURGERY  1996       Vitals 6/18/2022 4/27/2022 4/6/2022 3/2/2022 9/21/2021 4/19/2869   SYSTOLIC 672 333 860 121 621 124   DIASTOLIC 80 78 86 88 82 82   Pulse 86 88 93 78 90 100   Temp 99.1 98.6 97.7 97.8 97.1 97.8   Resp 18 18 16 18 18 18   SpO2 100 98 97 99 97 99   Weight 218 lb 222 lb 223 lb 223 lb 224 lb 217 lb   Height 5' 7\" - 5' 7\" 5' 7\" 5' 7\" 5' 7\"   Body mass index 34.14 kg/m2 - 34.92 kg/m2 34.92 kg/m2 35.08 kg/m2 33.98 kg/m2       Family History   Problem Relation Age of Onset    No Known Problems Mother     No Known Problems Father     Breast Cancer Paternal Grandmother        Social History     Tobacco Use    Smoking status: Former Smoker     Types: Cigarettes    Smokeless tobacco: Never Used   Substance Use Topics    Alcohol use: Not Currently     Comment: social      Current Outpatient Medications on File Prior to Visit   Medication Sig Dispense Refill    busPIRone (BUSPAR) 5 MG tablet Take 1 tablet by mouth 3 times daily 90 tablet 1    loratadine (CLARITIN) 10 MG tablet Take 1 tablet by mouth daily 30 tablet 5    fluticasone (FLONASE) 50 MCG/ACT nasal spray 2 sprays by Each Nostril route daily 16 g 3    Melatonin 10 MG TABS Take 1 tablet by mouth nightly as needed (insomnia) 30 tablet 0    albuterol sulfate HFA (VENTOLIN HFA) 108 (90 Base) MCG/ACT inhaler Inhale 2 puffs into the lungs every 6 hours as needed for Wheezing       No current facility-administered medications on file prior to visit. Allergies   Allergen Reactions    Molds & Smuts Shortness Of Breath       Health Maintenance   Topic Date Due    HIV screen  Never done    Hepatitis C screen  Never done    COVID-19 Vaccine (3 - Booster for Pfizer series) 09/19/2021    Flu vaccine (Season Ended) 09/01/2022    Depression Screen  05/16/2023    DTaP/Tdap/Td vaccine (2 - Td or Tdap) 03/02/2032    Hepatitis A vaccine  Aged Out    Hepatitis B vaccine  Aged Out    Hib vaccine  Aged Out    Meningococcal (ACWY) vaccine  Aged Out    Pneumococcal 0-64 years Vaccine  Aged Out    Varicella vaccine  Discontinued       Subjective:      Review of Systems   Constitutional: Positive for fever. HENT: Positive for sore throat. Painful swallowing   Eyes: Negative. Respiratory: Negative. Cardiovascular: Negative. Gastrointestinal: Negative. Endocrine: Negative. Genitourinary: Negative. Musculoskeletal: Negative. Skin: Negative. Allergic/Immunologic: Negative. Neurological: Negative. Hematological: Negative. Psychiatric/Behavioral: Negative. Objective:     Physical Exam  Vitals and nursing note reviewed. Constitutional:       Appearance: Normal appearance. He is normal weight. HENT:      Head: Normocephalic. Right Ear: Tympanic membrane normal.      Left Ear: Tympanic membrane normal.      Nose: Nose normal.      Mouth/Throat:      Mouth: Mucous membranes are moist.      Pharynx: Posterior oropharyngeal erythema present. Tonsils: No tonsillar exudate. 2+ on the right. 2+ on the left. Eyes:      General:         Right eye: No discharge.          Left eye: No discharge. Cardiovascular:      Rate and Rhythm: Normal rate and regular rhythm. Pulses: Normal pulses. Heart sounds: Normal heart sounds. Pulmonary:      Effort: Pulmonary effort is normal.      Breath sounds: Normal breath sounds. Musculoskeletal:      Cervical back: Normal range of motion. Tenderness present. Skin:     General: Skin is warm and dry. Neurological:      Mental Status: He is alert and oriented to person, place, and time. Psychiatric:         Mood and Affect: Mood normal.         Behavior: Behavior normal.         Thought Content: Thought content normal.         Judgment: Judgment normal.       /80   Pulse 86   Temp 99.1 °F (37.3 °C) (Temporal)   Resp 18   Ht 5' 7\" (1.702 m)   Wt 218 lb (98.9 kg)   SpO2 100%   BMI 34.14 kg/m²     Assessment:       Diagnosis Orders   1. Strep throat     2. Sore throat  POCT rapid strep A         Plan:   Amoxicillin 500mg bid x 10 days  Medrol dose pack as directed  Drink plenty of fluids  Patient information given     PDMP Monitoring:    Last PDMP Pankaj as Reviewed [facility]):  Review User Review Instant Review Result            Urine Drug Screenings (1 yr)    No resulted procedures found. Medication Contract and Consent for Opioid Use Documents Filed     Patient Documents     Type of Document Status Date Received Received By Description    Medication Contract Received 5/16/2022  4:21 PM Ambar Phelps                  Patient given educational materials -see patient instructions. Discussed use, benefit, and side effects of prescribed medications. All patient questions answered. Pt voiced understanding. Reviewed health maintenance. Instructed to continue currentmedications, diet and exercise. Patient agreed with treatment plan. Follow up as directed. MEDICATIONS:  Orders Placed This Encounter   Medications    methylPREDNISolone (MEDROL, ARMANI,) 4 MG tablet     Sig: Take by mouth.      Dispense:  1 kit     Refill:  0    amoxicillin (AMOXIL) 500 MG capsule     Sig: Take 1 capsule by mouth 2 times daily for 10 days     Dispense:  20 capsule     Refill:  0         ORDERS:  Orders Placed This Encounter   Procedures    POCT rapid strep A       Follow-up:  No follow-ups on file. PATIENT INSTRUCTIONS:  Patient Instructions       Patient Education        Strep Throat: Care Instructions  Your Care Instructions     Strep throat is a bacterial infection that causes sudden, severe sore throat and fever. Strep throat, which is caused by bacteria called streptococcus, is treated with antibiotics. Sometimes a strep test is necessary to tell if the sore throat is caused by strep bacteria. Treatment can help ease symptoms andmay prevent future problems. Follow-up care is a key part of your treatment and safety. Be sure to make and go to all appointments, and call your doctor if you are having problems. It's also a good idea to know your test results and keep alist of the medicines you take. How can you care for yourself at home?  Take your antibiotics as directed. Do not stop taking them just because you feel better. You need to take the full course of antibiotics.  Strep throat can spread to others until 24 hours after you begin taking antibiotics. During this time, avoid contact with other people at work, school, or home, especially infants and children. Do not sneeze or cough on others, and wash your hands often. Keep your drinking glass and eating utensils separate from those of others. Wash these items well in hot, soapy water.  Gargle with warm salt water at least once each hour to help reduce swelling and make your throat feel better. Use 1 teaspoon of salt mixed in 8 fluid ounces of warm water.  Take an over-the-counter pain medication, such as acetaminophen (Tylenol), ibuprofen (Advil, Motrin), or naproxen (Aleve). Read and follow all instructions on the label.    Try an over-the-counter anesthetic throat spray or throat lozenges, which may help relieve throat pain.  Drink plenty of fluids. Fluids may help soothe an irritated throat. Hot fluids, such as tea or soup, may help your throat feel better.  Eat soft solids and drink plenty of clear liquids. Flavored ice pops, ice cream, scrambled eggs, sherbet, and gelatin dessert (such as Jell-O) may also soothe the throat.  Get lots of rest.   Do not smoke, and avoid secondhand smoke. If you need help quitting, talk to your doctor about stop-smoking programs and medicines. These can increase your chances of quitting for good.  Use a vaporizer or humidifier to add moisture to the air in your bedroom. Follow the directions for cleaning the machine. When should you call for help? Call your doctor now or seek immediate medical care if:     You have a new or higher fever.      You have a fever with a stiff neck or severe headache.      You have new or worse trouble swallowing.      Your sore throat gets much worse on one side.      Your pain becomes much worse on one side of your throat. Watch closely for changes in your health, and be sure to contact your doctor if:     You are not getting better after 2 days (48 hours).      You do not get better as expected. Where can you learn more? Go to https://[employer].Packet[employer]. org and sign in to your [facility] account. Enter K625 in the [facility] box to learn more about \"Strep Throat: Care Instructions. \"     If you do not have an account, please click on the \"Sign Up Now\" link. Current as of: September 8, 2021               Content Version: 13.2  © 9888-0167 [employer]. Care instructions adapted under license by [facility] ([facility]). If you have questions about a medical condition or this instruction, always ask your healthcare professional. [address] [address] any warranty or liability for your use of this information.            Electronically signed by Jenette Collet, APRN on 6/18/2022 at 8:41 AM    EMR Dragon/transcription disclaimer:  Much of thisencounter note is electronic transcription/translation of spoken language to printed texts. The electronic translation of spoken language may be erroneous, or at times, nonsensical words or phrases may be inadvertentlytranscribed.   Although I have reviewed the note for such errors, some may still exist.

## 2022-06-18 NOTE — PATIENT INSTRUCTIONS
Patient Education        Strep Throat: Care Instructions  Your Care Instructions     Strep throat is a bacterial infection that causes sudden, severe sore throat and fever. Strep throat, which is caused by bacteria called streptococcus, is treated with antibiotics. Sometimes a strep test is necessary to tell if the sore throat is caused by strep bacteria. Treatment can help ease symptoms andmay prevent future problems. Follow-up care is a key part of your treatment and safety. Be sure to make and go to all appointments, and call your doctor if you are having problems. It's also a good idea to know your test results and keep alist of the medicines you take. How can you care for yourself at home?  Take your antibiotics as directed. Do not stop taking them just because you feel better. You need to take the full course of antibiotics.  Strep throat can spread to others until 24 hours after you begin taking antibiotics. During this time, avoid contact with other people at work, school, or home, especially infants and children. Do not sneeze or cough on others, and wash your hands often. Keep your drinking glass and eating utensils separate from those of others. Wash these items well in hot, soapy water.  Gargle with warm salt water at least once each hour to help reduce swelling and make your throat feel better. Use 1 teaspoon of salt mixed in 8 fluid ounces of warm water.  Take an over-the-counter pain medication, such as acetaminophen (Tylenol), ibuprofen (Advil, Motrin), or naproxen (Aleve). Read and follow all instructions on the label.  Try an over-the-counter anesthetic throat spray or throat lozenges, which may help relieve throat pain.  Drink plenty of fluids. Fluids may help soothe an irritated throat. Hot fluids, such as tea or soup, may help your throat feel better.  Eat soft solids and drink plenty of clear liquids.  Flavored ice pops, ice cream, scrambled eggs, sherbet, and gelatin dessert

## 2022-06-29 ENCOUNTER — TELEPHONE (OUTPATIENT)
Dept: PSYCHIATRY | Age: 27
End: 2022-06-29

## 2022-06-29 NOTE — TELEPHONE ENCOUNTER
I called to remind patient of tomorrow's appt at 1PM. I was unable to reach Patient so I left a voicemail

## 2022-06-30 ENCOUNTER — TELEPHONE (OUTPATIENT)
Dept: PSYCHIATRY | Age: 27
End: 2022-06-30

## 2022-06-30 NOTE — TELEPHONE ENCOUNTER
I called patient to check in since he no showed his 1PM appt, I was unable to reach patient so I left a voicemail.

## 2022-07-05 ENCOUNTER — TELEPHONE (OUTPATIENT)
Dept: PSYCHIATRY | Age: 27
End: 2022-07-05

## 2022-07-05 NOTE — TELEPHONE ENCOUNTER
Called to reschedule his appt for [email or web address] due to the provider being out the office at that time    lvm for pt to return phone call     Electronically signed by Fede Young on 7/5/2022 at 4:09 PM

## 2022-07-07 ENCOUNTER — TELEPHONE (OUTPATIENT)
Dept: PSYCHIATRY | Age: 27
End: 2022-07-07

## 2022-07-07 NOTE — TELEPHONE ENCOUNTER
Call pt to reschedule his appt for [email or web address] due to the provider being out the office at that time    lvm for pt to return phone call    Electronically signed by Isabel Rodríguez on 7/7/2022 at 10:49 AM

## 2022-07-18 ENCOUNTER — TELEPHONE (OUTPATIENT)
Dept: PSYCHIATRY | Age: 27
End: 2022-07-18

## 2022-07-18 NOTE — TELEPHONE ENCOUNTER
Called pt for appointment reminder.     -left voicemail, requesting a return call    Electronically signed by Denise Saunders MA on 7/18/2022 at 1:59 PM

## 2022-07-18 NOTE — TELEPHONE ENCOUNTER
Called pt to let them know that the provider will not be in the office tomorrow 7/19 for his appt. So we would have to reschedule.     No answer and LVM    Electronically signed by Azeb Stephens MA on 7/18/2022 at 3:26 PM

## 2022-11-29 ENCOUNTER — OFFICE VISIT (OUTPATIENT)
Age: 27
End: 2022-11-29
Payer: COMMERCIAL

## 2022-11-29 VITALS
BODY MASS INDEX: 35.31 KG/M2 | HEART RATE: 96 BPM | RESPIRATION RATE: 18 BRPM | OXYGEN SATURATION: 96 % | TEMPERATURE: 97.4 F | SYSTOLIC BLOOD PRESSURE: 127 MMHG | HEIGHT: 67 IN | WEIGHT: 225 LBS | DIASTOLIC BLOOD PRESSURE: 80 MMHG

## 2022-11-29 DIAGNOSIS — R05.1 ACUTE COUGH: ICD-10-CM

## 2022-11-29 DIAGNOSIS — J02.9 SORE THROAT: ICD-10-CM

## 2022-11-29 DIAGNOSIS — B34.9 VIRAL ILLNESS: Primary | ICD-10-CM

## 2022-11-29 LAB
INFLUENZA A ANTIBODY: NEGATIVE
INFLUENZA B ANTIBODY: NEGATIVE
S PYO AG THROAT QL: NORMAL

## 2022-11-29 PROCEDURE — 87804 INFLUENZA ASSAY W/OPTIC: CPT | Performed by: NURSE PRACTITIONER

## 2022-11-29 PROCEDURE — 3078F DIAST BP <80 MM HG: CPT | Performed by: NURSE PRACTITIONER

## 2022-11-29 PROCEDURE — 87880 STREP A ASSAY W/OPTIC: CPT | Performed by: NURSE PRACTITIONER

## 2022-11-29 PROCEDURE — 3074F SYST BP LT 130 MM HG: CPT | Performed by: NURSE PRACTITIONER

## 2022-11-29 PROCEDURE — 99213 OFFICE O/P EST LOW 20 MIN: CPT | Performed by: NURSE PRACTITIONER

## 2022-11-29 RX ORDER — CETIRIZINE HYDROCHLORIDE 10 MG/1
10 TABLET ORAL DAILY
Qty: 30 TABLET | Refills: 0 | Status: SHIPPED | OUTPATIENT
Start: 2022-11-29 | End: 2022-12-29

## 2022-11-29 RX ORDER — GUAIFENESIN 600 MG/1
600 TABLET, EXTENDED RELEASE ORAL 2 TIMES DAILY
Qty: 30 TABLET | Refills: 0 | Status: SHIPPED | OUTPATIENT
Start: 2022-11-29 | End: 2022-12-14

## 2022-11-29 ASSESSMENT — ENCOUNTER SYMPTOMS
COUGH: 1
SORE THROAT: 1
SHORTNESS OF BREATH: 0
GASTROINTESTINAL NEGATIVE: 1
ALLERGIC/IMMUNOLOGIC NEGATIVE: 1
WHEEZING: 0
EYES NEGATIVE: 1

## 2022-11-29 NOTE — PATIENT INSTRUCTIONS
Follow-up with your PCP in 3 days if symptoms do not improve or if worsening; if symptoms suddenly change or worsen, including shortness of breath or difficulty swallowing, go to the emergency department. Patient verbalized understanding. Tylenol or Motrin for fever or pain as needed. Rest and increase fluid intake. Coolmist humidifier. Start Zyrtec daily. Start Mucinex for congestion. Start Flonase daily. Gargle with warm salt water several times daily for sore throat.

## 2022-11-29 NOTE — PROGRESS NOTES
Linus Sandoval (:  1995) is a 28 y.o. male,Established patient, here for evaluation of the following chief complaint(s):  Cough, Pharyngitis, and Congestion    Patient presents today complaining of cough, sore throat, and congestion that began 2 days ago. Denies fever, shortness of breath, or GI symptoms. He states his wife told him he was slightly wheezing in his sleep last night. Discussed with patient that his rapid strep and flu test were both negative today. His lung sounds are clear at this time. I offered to order a chest x-ray although lung sounds are clear. Patient would like to wait on chest x-ray. I offered to send in an albuterol inhaler for shortness of breath or wheezing if needed. Patient states he has an inhaler at home. Patient has 3 refills on his Flonase nasal spray available. Patient declines COVID testing today. Care instructions discussed. Patient verbalized understanding and agrees to plan of care. ASSESSMENT/PLAN:  1. Viral illness  2. Sore throat  -     POCT Influenza A/B  -     POCT rapid strep A  3. Acute cough     Orders Placed This Encounter   Medications    guaiFENesin (MUCINEX) 600 MG extended release tablet     Sig: Take 1 tablet by mouth 2 times daily for 15 days     Dispense:  30 tablet     Refill:  0    cetirizine (ZYRTEC) 10 MG tablet     Sig: Take 1 tablet by mouth daily     Dispense:  30 tablet     Refill:  0        Return if symptoms worsen or fail to improve. Subjective   SUBJECTIVE/OBJECTIVE:  HPI    Review of Systems   Constitutional: Negative. HENT:  Positive for congestion and sore throat. Eyes: Negative. Respiratory:  Positive for cough. Negative for shortness of breath and wheezing. Cardiovascular: Negative. Gastrointestinal: Negative. Endocrine: Negative. Genitourinary: Negative. Musculoskeletal: Negative. Skin: Negative. Allergic/Immunologic: Negative. Neurological: Negative.     Hematological: Negative. Psychiatric/Behavioral: Negative. Objective   Physical Exam  Vitals reviewed. HENT:      Right Ear: Tympanic membrane, ear canal and external ear normal.      Left Ear: Tympanic membrane, ear canal and external ear normal.      Nose:      Right Sinus: No maxillary sinus tenderness or frontal sinus tenderness. Left Sinus: No maxillary sinus tenderness or frontal sinus tenderness. Mouth/Throat:      Lips: Pink. Mouth: Mucous membranes are moist.      Pharynx: Posterior oropharyngeal erythema (postnasal drainage) present. No oropharyngeal exudate. Cardiovascular:      Rate and Rhythm: Normal rate and regular rhythm. Heart sounds: Normal heart sounds. Pulmonary:      Effort: Pulmonary effort is normal.      Breath sounds: Normal breath sounds. Lymphadenopathy:      Head:      Right side of head: No submandibular or tonsillar adenopathy. Left side of head: No submandibular or tonsillar adenopathy. Cervical:      Right cervical: No superficial cervical adenopathy. Left cervical: No superficial cervical adenopathy. Skin:     General: Skin is warm and dry. Neurological:      Mental Status: He is alert. Patient Instructions         Follow-up with your PCP in 3 days if symptoms do not improve or if worsening; if symptoms suddenly change or worsen, including shortness of breath or difficulty swallowing, go to the emergency department. Patient verbalized understanding. Tylenol or Motrin for fever or pain as needed. Rest and increase fluid intake. Coolmist humidifier. Start Zyrtec daily. Start Mucinex for congestion. Start Flonase daily. Gargle with warm salt water several times daily for sore throat. An electronic signature was used to authenticate this note.     --Jose Guadalupe Garcia, TAHIR - CNP     EMR Dragon/translation disclaimer: Much of this encounter note is an electronic transcription/translation of spoken language to printed text. The electronic translation of spoken language may be erroneous, or at times, nonsensical words or phrases may be inadvertently transcribed.   Although I have reviewed the note for such errors, some may still exist.

## 2023-05-03 ENCOUNTER — OFFICE VISIT (OUTPATIENT)
Dept: PRIMARY CARE CLINIC | Age: 28
End: 2023-05-03

## 2023-05-03 VITALS
SYSTOLIC BLOOD PRESSURE: 132 MMHG | HEIGHT: 67 IN | TEMPERATURE: 98.3 F | HEART RATE: 85 BPM | BODY MASS INDEX: 34.53 KG/M2 | WEIGHT: 220 LBS | DIASTOLIC BLOOD PRESSURE: 86 MMHG | OXYGEN SATURATION: 97 %

## 2023-05-03 DIAGNOSIS — F51.01 PRIMARY INSOMNIA: Primary | ICD-10-CM

## 2023-05-03 DIAGNOSIS — Z00.00 WELL ADULT EXAM: ICD-10-CM

## 2023-05-03 DIAGNOSIS — L08.9 BACTERIAL SKIN INFECTION: ICD-10-CM

## 2023-05-03 DIAGNOSIS — E78.2 MIXED HYPERLIPIDEMIA: ICD-10-CM

## 2023-05-03 DIAGNOSIS — B96.89 BACTERIAL SKIN INFECTION: ICD-10-CM

## 2023-05-03 DIAGNOSIS — I10 ESSENTIAL HYPERTENSION: ICD-10-CM

## 2023-05-03 PROCEDURE — 99395 PREV VISIT EST AGE 18-39: CPT | Performed by: NURSE PRACTITIONER

## 2023-05-03 PROCEDURE — 3079F DIAST BP 80-89 MM HG: CPT | Performed by: NURSE PRACTITIONER

## 2023-05-03 PROCEDURE — 3075F SYST BP GE 130 - 139MM HG: CPT | Performed by: NURSE PRACTITIONER

## 2023-05-03 RX ORDER — LISINOPRIL 10 MG/1
10 TABLET ORAL DAILY
Qty: 90 TABLET | Refills: 1 | Status: SHIPPED | OUTPATIENT
Start: 2023-05-03

## 2023-05-03 RX ORDER — LORATADINE 10 MG/1
10 TABLET ORAL DAILY
Qty: 90 TABLET | Refills: 3 | Status: SHIPPED | OUTPATIENT
Start: 2023-05-03

## 2023-05-03 RX ORDER — METHYLPREDNISOLONE 4 MG/1
TABLET ORAL
Qty: 1 KIT | Refills: 0 | Status: SHIPPED | OUTPATIENT
Start: 2023-05-03 | End: 2023-05-09

## 2023-05-03 RX ORDER — CEPHALEXIN 500 MG/1
500 CAPSULE ORAL 2 TIMES DAILY
Qty: 20 CAPSULE | Refills: 0 | Status: SHIPPED | OUTPATIENT
Start: 2023-05-03 | End: 2023-05-13

## 2023-05-03 SDOH — ECONOMIC STABILITY: HOUSING INSECURITY
IN THE LAST 12 MONTHS, WAS THERE A TIME WHEN YOU DID NOT HAVE A STEADY PLACE TO SLEEP OR SLEPT IN A SHELTER (INCLUDING NOW)?: NO

## 2023-05-03 SDOH — ECONOMIC STABILITY: INCOME INSECURITY: HOW HARD IS IT FOR YOU TO PAY FOR THE VERY BASICS LIKE FOOD, HOUSING, MEDICAL CARE, AND HEATING?: NOT HARD AT ALL

## 2023-05-03 SDOH — ECONOMIC STABILITY: FOOD INSECURITY: WITHIN THE PAST 12 MONTHS, YOU WORRIED THAT YOUR FOOD WOULD RUN OUT BEFORE YOU GOT MONEY TO BUY MORE.: NEVER TRUE

## 2023-05-03 SDOH — ECONOMIC STABILITY: FOOD INSECURITY: WITHIN THE PAST 12 MONTHS, THE FOOD YOU BOUGHT JUST DIDN'T LAST AND YOU DIDN'T HAVE MONEY TO GET MORE.: NEVER TRUE

## 2023-05-03 ASSESSMENT — PATIENT HEALTH QUESTIONNAIRE - PHQ9
1. LITTLE INTEREST OR PLEASURE IN DOING THINGS: 0
SUM OF ALL RESPONSES TO PHQ9 QUESTIONS 1 & 2: 0
2. FEELING DOWN, DEPRESSED OR HOPELESS: 0
SUM OF ALL RESPONSES TO PHQ QUESTIONS 1-9: 0

## 2023-05-03 ASSESSMENT — ENCOUNTER SYMPTOMS
ABDOMINAL PAIN: 0
WHEEZING: 0
CHEST TIGHTNESS: 0
DIARRHEA: 0
SHORTNESS OF BREATH: 0
SORE THROAT: 0
COUGH: 0
NAUSEA: 0

## 2023-05-03 NOTE — PROGRESS NOTES
Jeremias Marino is a 28 y.o. male who presents today for  Chief Complaint   Patient presents with    Annual Exam    Skin Problem     Sores on his leg and forehead that have been there for about a month. He says when the scab comes off they burn and hare sore. HPI:  Here for physical.  IUTD - Discussed bivalent covid booster    He has had recurrent lesions to his legs and arms for the past month. Now appearing on face. Lesions start small, red and get bigger, crust over. Initially pruritic but lesser so as the lesions get bigger. He had COVID 4 to 5 months ago. He had a cough for couple days but has recovered well. He has had initial vaccine series and booster x1. Anxiety is stable, improved since he switched jobs. Less stress. He is no longer followed by psychiatry. He states he is no longer taking buspirone. Insomnia is stable, improved. He is not requiring a sleep aid. He takes melatonin rarely. Hyperlipidemia  Diet controlled. Attempting to reduce processed sugar and cholesterol from diet. Hypertension  He reports a hx of taking lisinopril prior to establishing with me in 2021. He has had borderline readings here in the past but has not required medication recently. No lightheadedness, palpitations, or chest pain. Review of Systems   Constitutional:  Negative for chills and fever. HENT:  Negative for congestion, ear pain and sore throat. Respiratory:  Negative for cough, chest tightness, shortness of breath and wheezing. Cardiovascular:  Negative for chest pain. Gastrointestinal:  Negative for abdominal pain, diarrhea and nausea. Musculoskeletal:  Negative for arthralgias and myalgias. Skin:  Positive for rash. Neurological:  Negative for dizziness and light-headedness.      Past Medical History:   Diagnosis Date    Allergic rhinitis     Anxiety     Colitis     Depression     Essential hypertension        Current Outpatient Medications   Medication Sig

## 2023-09-25 ENCOUNTER — OFFICE VISIT (OUTPATIENT)
Age: 28
End: 2023-09-25
Payer: COMMERCIAL

## 2023-09-25 VITALS
RESPIRATION RATE: 20 BRPM | WEIGHT: 227 LBS | HEART RATE: 100 BPM | TEMPERATURE: 97.9 F | BODY MASS INDEX: 35.55 KG/M2 | SYSTOLIC BLOOD PRESSURE: 142 MMHG | OXYGEN SATURATION: 97 % | DIASTOLIC BLOOD PRESSURE: 102 MMHG

## 2023-09-25 DIAGNOSIS — R05.9 COUGH, UNSPECIFIED TYPE: Primary | ICD-10-CM

## 2023-09-25 LAB — SARS-COV-2 N GENE RESP QL NAA+PROBE: NOT DETECTED

## 2023-09-25 PROCEDURE — 3080F DIAST BP >= 90 MM HG: CPT | Performed by: NURSE PRACTITIONER

## 2023-09-25 PROCEDURE — 3077F SYST BP >= 140 MM HG: CPT | Performed by: NURSE PRACTITIONER

## 2023-09-25 PROCEDURE — 99213 OFFICE O/P EST LOW 20 MIN: CPT | Performed by: NURSE PRACTITIONER

## 2023-09-25 ASSESSMENT — ENCOUNTER SYMPTOMS: COUGH: 1

## 2023-09-25 NOTE — PROGRESS NOTES
[address]  [address] [place] [address]  Dept: [phone]  Dept Fax: [phone]  Loc: [phone]     Claudetta Campanile is a 33 y.o. male who presents today for his medical conditions/complaintsas noted below. Claudetta Campanile is c/o of Congestion (For 2 days ), Cough, and Fatigue        HPI:     Cough  Pertinent negatives include no chills. Fatigue  Associated symptoms include congestion, coughing and fatigue. Pertinent negatives include no chills. URI  This is a new problem. The current episode started in the past 7 days. The problem occurs constantly. The problem has been unchanged. Associated symptoms include congestion, coughing and a sore throat. Pertinent negatives include  abdominal pain, anorexia, arthralgias, change in bowel habit, chest pain, chills, diaphoresis, fatigue, fever, headaches, joint swelling, myalgias, nausea, neck pain, numbness, rash, swollen glands, urinary symptoms, vertigo, visual change, vomiting or weakness. Pt tried OTC meds with mild symptom relief. Denies any other symptoms.        Past Medical History:   Diagnosis Date    Allergic rhinitis     Anxiety     Colitis     Depression     Essential hypertension       Past Surgical History:   Procedure Laterality Date    EYE SURGERY  1996       Family History   Problem Relation Age of Onset    No Known Problems Mother     Hypertension Father     Breast Cancer Paternal Grandmother     Hypertension Paternal Grandmother     Hypertension Paternal Grandfather        Social History     Tobacco Use    Smoking status: Former     Types: Cigarettes    Smokeless tobacco: Never   Substance Use Topics    Alcohol use: Not Currently     Comment: social      Current Outpatient Medications   Medication Sig Dispense Refill    loratadine (CLARITIN) 10 MG tablet Take 1 tablet by mouth daily 90 tablet 3    lisinopril (PRINIVIL;ZESTRIL) 10 MG tablet Take 1 tablet by mouth

## 2023-10-01 ENCOUNTER — E-VISIT (OUTPATIENT)
Dept: INTERNAL MEDICINE | Age: 28
End: 2023-10-01
Payer: COMMERCIAL

## 2023-10-01 DIAGNOSIS — J01.90 ACUTE NON-RECURRENT SINUSITIS, UNSPECIFIED LOCATION: Primary | ICD-10-CM

## 2023-10-01 PROCEDURE — 99421 OL DIG E/M SVC 5-10 MIN: CPT | Performed by: INTERNAL MEDICINE

## 2023-10-01 RX ORDER — AZITHROMYCIN 250 MG/1
250 TABLET, FILM COATED ORAL SEE ADMIN INSTRUCTIONS
Qty: 6 TABLET | Refills: 0 | Status: SHIPPED | OUTPATIENT
Start: 2023-10-01 | End: 2023-10-06

## 2023-10-01 RX ORDER — BENZONATATE 200 MG/1
200 CAPSULE ORAL 3 TIMES DAILY PRN
Qty: 30 CAPSULE | Refills: 0 | Status: SHIPPED | OUTPATIENT
Start: 2023-10-01

## 2023-10-01 ASSESSMENT — LIFESTYLE VARIABLES: SMOKING_STATUS: NO, I'VE NEVER SMOKED

## 2023-11-05 ENCOUNTER — OFFICE VISIT (OUTPATIENT)
Age: 28
End: 2023-11-05
Payer: COMMERCIAL

## 2023-11-05 VITALS
TEMPERATURE: 98.2 F | BODY MASS INDEX: 35 KG/M2 | RESPIRATION RATE: 18 BRPM | DIASTOLIC BLOOD PRESSURE: 72 MMHG | HEIGHT: 67 IN | OXYGEN SATURATION: 97 % | WEIGHT: 223 LBS | HEART RATE: 76 BPM | SYSTOLIC BLOOD PRESSURE: 138 MMHG

## 2023-11-05 DIAGNOSIS — H10.9 BACTERIAL CONJUNCTIVITIS OF LEFT EYE: Primary | ICD-10-CM

## 2023-11-05 PROCEDURE — 99213 OFFICE O/P EST LOW 20 MIN: CPT

## 2023-11-05 PROCEDURE — 3078F DIAST BP <80 MM HG: CPT

## 2023-11-05 PROCEDURE — 3075F SYST BP GE 130 - 139MM HG: CPT

## 2023-11-05 RX ORDER — TOBRAMYCIN 3 MG/ML
1 SOLUTION/ DROPS OPHTHALMIC EVERY 4 HOURS
Qty: 1 EACH | Refills: 0 | Status: SHIPPED | OUTPATIENT
Start: 2023-11-05 | End: 2023-11-12

## 2023-11-05 ASSESSMENT — ENCOUNTER SYMPTOMS
SORE THROAT: 0
EYE DISCHARGE: 1
CONSTIPATION: 0
EYE REDNESS: 1
VOMITING: 0
FACIAL SWELLING: 0
SINUS PAIN: 0
DOUBLE VISION: 0
EYE PAIN: 1
TROUBLE SWALLOWING: 0
CHEST TIGHTNESS: 0
COUGH: 0
SINUS PRESSURE: 0
COLOR CHANGE: 0
WHEEZING: 0
DIARRHEA: 0
EYE ITCHING: 1
ABDOMINAL PAIN: 0
APNEA: 0
SHORTNESS OF BREATH: 0
ORTHOPNEA: 0
SWOLLEN GLANDS: 0
RHINORRHEA: 0
CHOKING: 0
STRIDOR: 0
PHOTOPHOBIA: 0
NAUSEA: 0
ABDOMINAL DISTENTION: 0

## 2023-11-09 ENCOUNTER — E-VISIT (OUTPATIENT)
Dept: INTERNAL MEDICINE | Age: 28
End: 2023-11-09
Payer: COMMERCIAL

## 2023-11-09 DIAGNOSIS — J01.90 ACUTE SINUSITIS, RECURRENCE NOT SPECIFIED, UNSPECIFIED LOCATION: Primary | ICD-10-CM

## 2023-11-09 PROCEDURE — 99421 OL DIG E/M SVC 5-10 MIN: CPT | Performed by: INTERNAL MEDICINE

## 2023-11-09 RX ORDER — AMOXICILLIN AND CLAVULANATE POTASSIUM 875; 125 MG/1; MG/1
1 TABLET, FILM COATED ORAL 2 TIMES DAILY
Qty: 20 TABLET | Refills: 0 | Status: SHIPPED | OUTPATIENT
Start: 2023-11-09 | End: 2023-11-19

## 2023-11-09 RX ORDER — METHYLPREDNISOLONE 4 MG/1
TABLET ORAL
Qty: 1 KIT | Refills: 0 | Status: SHIPPED | OUTPATIENT
Start: 2023-11-09 | End: 2023-11-15

## 2023-11-09 ASSESSMENT — LIFESTYLE VARIABLES: SMOKING_STATUS: NO, I'VE NEVER SMOKED

## 2023-11-09 NOTE — PROGRESS NOTES
Submitted an E-visit for what appears to be a sinus infection. He was advised to be tested for COVID-19 if he has not yet done so. I also sent in a prescription for a Medrol Dosepak and Augmentin to his pharmacy.     Electronically signed by Sendy Narvaez MD on 11/9/2023 at 12:35 PM

## 2023-11-16 ENCOUNTER — E-VISIT (OUTPATIENT)
Dept: INTERNAL MEDICINE | Age: 28
End: 2023-11-16

## 2023-11-16 DIAGNOSIS — J01.90 ACUTE SINUSITIS, RECURRENCE NOT SPECIFIED, UNSPECIFIED LOCATION: Primary | ICD-10-CM

## 2023-11-16 RX ORDER — AZITHROMYCIN 250 MG/1
250 TABLET, FILM COATED ORAL SEE ADMIN INSTRUCTIONS
Qty: 6 TABLET | Refills: 0 | Status: SHIPPED | OUTPATIENT
Start: 2023-11-16 | End: 2023-11-21

## 2023-11-16 RX ORDER — METHYLPREDNISOLONE 4 MG/1
TABLET ORAL
Qty: 1 KIT | Refills: 0 | Status: SHIPPED | OUTPATIENT
Start: 2023-11-16 | End: 2023-11-22

## 2023-11-16 ASSESSMENT — LIFESTYLE VARIABLES: SMOKING_STATUS: NO, I'VE NEVER SMOKED

## 2023-11-16 NOTE — PROGRESS NOTES
Submitted an E-visit for what appears to be a sinus infection. He is already failed treatment with amoxicillin. We will call in a Z-Ryan. Has been advised that if his symptoms do not improve, he needs to see his PCP for further evaluation. I will also call in some eardrops. I will also call him in a steroid pack.     Electronically signed by Keith Figueredo MD on 11/16/2023 at 12:06 PM

## 2024-06-27 PROBLEM — H10.9 CONJUNCTIVITIS OF LEFT EYE: Status: ACTIVE | Noted: 2024-06-27

## 2024-06-28 ENCOUNTER — PATIENT ROUNDING (BHMG ONLY) (OUTPATIENT)
Dept: URGENT CARE | Facility: CLINIC | Age: 29
End: 2024-06-28
Payer: COMMERCIAL

## 2024-06-28 NOTE — ED NOTES
Thank you for letting us care for you in your recent visit to our urgent care center. We would love to hear about your experience with us. Was this the first time you have visited our location?    We’re always looking for ways to make our patients’ experiences even better. Do you have any recommendations on ways we may improve?     I appreciate you taking the time to respond. Please be on the lookout for a survey about your recent visit from [employer] via text or email. We would greatly appreciate if you could fill that out and turn it back in. We want your voice to be heard and we value your feedback.   Thank you for choosing [facility] for your healthcare needs.

## 2024-07-21 ENCOUNTER — OFFICE VISIT (OUTPATIENT)
Age: 29
End: 2024-07-21

## 2024-07-21 ENCOUNTER — HOSPITAL ENCOUNTER (OUTPATIENT)
Dept: GENERAL RADIOLOGY | Age: 29
Discharge: HOME | End: 2024-07-21
Payer: COMMERCIAL

## 2024-07-21 DIAGNOSIS — R09.89 CHEST CONGESTION: ICD-10-CM

## 2024-07-21 DIAGNOSIS — J06.9 VIRAL UPPER RESPIRATORY INFECTION: Primary | ICD-10-CM

## 2024-07-21 DIAGNOSIS — R05.9 COUGH, UNSPECIFIED TYPE: ICD-10-CM

## 2024-07-21 DIAGNOSIS — R06.02 SHORTNESS OF BREATH: ICD-10-CM

## 2024-07-21 DIAGNOSIS — J06.9 VIRAL UPPER RESPIRATORY INFECTION: ICD-10-CM

## 2024-07-21 LAB
INFLUENZA A ANTIBODY: NEGATIVE
INFLUENZA B ANTIBODY: NEGATIVE
Lab: NORMAL
QC PASS/FAIL: NORMAL
S PYO AG THROAT QL: NORMAL
SARS-COV-2 RDRP RESP QL NAA+PROBE: NEGATIVE

## 2024-07-21 PROCEDURE — 71046 X-RAY EXAM CHEST 2 VIEWS: CPT

## 2024-07-21 RX ORDER — AMOXICILLIN AND CLAVULANATE POTASSIUM 875; 125 MG/1; MG/1
1 TABLET, FILM COATED ORAL 2 TIMES DAILY
Qty: 14 TABLET | Refills: 0 | Status: SHIPPED | OUTPATIENT
Start: 2024-07-21 | End: 2024-07-28